# Patient Record
Sex: FEMALE | Race: WHITE | NOT HISPANIC OR LATINO | Employment: PART TIME | URBAN - METROPOLITAN AREA
[De-identification: names, ages, dates, MRNs, and addresses within clinical notes are randomized per-mention and may not be internally consistent; named-entity substitution may affect disease eponyms.]

---

## 2019-05-14 ENCOUNTER — TRANSFERRED RECORDS (OUTPATIENT)
Dept: HEALTH INFORMATION MANAGEMENT | Facility: CLINIC | Age: 59
End: 2019-05-14

## 2019-08-27 ENCOUNTER — DOCUMENTATION ONLY (OUTPATIENT)
Dept: CARE COORDINATION | Facility: CLINIC | Age: 59
End: 2019-08-27

## 2019-08-27 NOTE — TELEPHONE ENCOUNTER
FUTURE VISIT INFORMATION      FUTURE VISIT INFORMATION:    Date: 9/4/19    Time: 8AM    [place] [facility] [place]  REFERRAL INFORMATION:    Referring provider:  Dr Yasir Guerra    Referring providers clinic:  [facility]     Reason for visit/diagnosis  Cholestratoma     RECORDS REQUESTED FROM:       Clinic name Comments Records Status Imaging Status   [facility] 8/28/19 CT Temp  [facility] PACS   [facility]  8/28/19 audiogram  EPIC    [facility] 5/14/19, 4/23/19 notes with Dr Guerra Sent to scan 8/27 8/27/19 11:13AM records from referrals team are being forwarded to clinic.  Sent a fax to [facility] to see if there are any more recent office notes or audiograms- amay     8/28/19 4:40PM [facility] sent a fax back stating patient has not been seen on any other days than the note we have - Amay

## 2019-08-28 ENCOUNTER — OFFICE VISIT (OUTPATIENT)
Dept: AUDIOLOGY | Facility: CLINIC | Age: 59
End: 2019-08-28
Payer: COMMERCIAL

## 2019-08-28 ENCOUNTER — ANCILLARY PROCEDURE (OUTPATIENT)
Dept: CT IMAGING | Facility: CLINIC | Age: 59
End: 2019-08-28
Attending: OTOLARYNGOLOGY
Payer: COMMERCIAL

## 2019-08-28 DIAGNOSIS — H90.0 CONDUCTIVE HEARING LOSS, BILATERAL: Primary | ICD-10-CM

## 2019-08-28 DIAGNOSIS — H71.92 CHOLESTEATOMA, LEFT: ICD-10-CM

## 2019-08-28 NOTE — PROGRESS NOTES
AUDIOLOGY REPORT    SUMMARY: Audiology visit completed. See audiogram for results.      RECOMMENDATIONS: Follow-up with ENT.      Joseph Santiago.  Licensed Audiologist  [place] #[id number]

## 2019-08-29 ENCOUNTER — TELEPHONE (OUTPATIENT)
Dept: OTOLARYNGOLOGY | Facility: CLINIC | Age: 59
End: 2019-08-29

## 2019-08-29 NOTE — TELEPHONE ENCOUNTER
Left message for patient advising her that Dr. Live reviewed her hearing test and CT. The hearing test shows mild middle ear hearing loss bilaterally and her ear drums are pulled in towards her ear, left being worse than right. Her CT does not look bad. If able, Dr. Live wants her to try popping her ears between now and her appointment by plugging her nose and blowing several times a day. If it is painful do not know do it. Left this nurses contact information for call back.

## 2019-09-04 ENCOUNTER — PRE VISIT (OUTPATIENT)
Dept: OTOLARYNGOLOGY | Facility: CLINIC | Age: 59
End: 2019-09-04

## 2019-09-04 ENCOUNTER — OFFICE VISIT (OUTPATIENT)
Dept: OTOLARYNGOLOGY | Facility: CLINIC | Age: 59
End: 2019-09-04
Payer: COMMERCIAL

## 2019-09-04 VITALS — WEIGHT: 151.4 LBS | DIASTOLIC BLOOD PRESSURE: 86 MMHG | HEART RATE: 90 BPM | SYSTOLIC BLOOD PRESSURE: 139 MMHG

## 2019-09-04 DIAGNOSIS — H69.93 DYSFUNCTION OF BOTH EUSTACHIAN TUBES: Primary | ICD-10-CM

## 2019-09-04 RX ORDER — TIOTROPIUM BROMIDE 18 UG/1
1 CAPSULE ORAL; RESPIRATORY (INHALATION) EVERY EVENING
COMMUNITY
Start: 2019-08-19

## 2019-09-04 RX ORDER — MULTIVITAMIN/IRON/FOLIC ACID 18MG-0.4MG
1 TABLET ORAL EVERY MORNING
COMMUNITY

## 2019-09-04 RX ORDER — ALBUTEROL SULFATE 0.83 MG/ML
2.5 SOLUTION RESPIRATORY (INHALATION) PRN
COMMUNITY
Start: 2017-02-23

## 2019-09-04 RX ORDER — IBUPROFEN 200 MG
200 TABLET ORAL PRN
COMMUNITY
End: 2021-02-09

## 2019-09-04 ASSESSMENT — PAIN SCALES - GENERAL: PAINLEVEL: NO PAIN (0)

## 2019-09-04 NOTE — NURSING NOTE
Chief Complaint   Patient presents with     Consult     cholesteatoma left ear     Keily Alex LPN

## 2019-09-04 NOTE — LETTER
9/4/2019       RE: Trupti Mcbride  [address] [address]     Dear Colleague,    Thank you for referring your patient, Trupti Mcbride, to the [facility] EAR NOSE AND THROAT at [facility]. Please see a copy of my visit note below.    Trupti Mcbride is seen in consultation from Dr. Guerra.  She is a 59 year old female being seen for possible left cholesteatoma.  She reports that she has been having cerumen impactions over the last few years and had her ear flushed once by her PCP which was terrible so she decided to have her ears cleaned by ENT.  Dr. Guerra noted a deep retraction pocket on the left.  She has had no ear problems--no otalgia, otorrhea, fullness or hearing loss.  No history of recurrent ear infections, even as a child.    PMHx:  COPD, hypercholesterolemia, anxiety    PSHx:  No otologic surgery    Family History   Problem Relation Age of Onset     Unknown/Adopted Other      Heart Disease Father      Hypertension Father      Hypertension Mother      Hypertension Sister        Social History     Tobacco Use     Smoking status: Light Tobacco Smoker     Packs/day: 0.50     Years: 35.00     Pack years: 17.50     Types: Cigarettes     Smokeless tobacco: Never Used   Substance Use Topics     Alcohol use: Yes     Drug use: Not Currently     Types: Marijuana       Patient Supplied Answers to Review of Systems  [facility] ROS 9/4/2019   Constitutional Problems with sleep   Psychology Frequently feeling anxious   Gastrointestinal/Genitourinary Constipation   The remainder of the 10 point review of systems is otherwise negative.    Physical examination:  Constitutional:  In no acute distress, appears stated age  Eyes:  Extraocular movements intact, no spontaneous nystagmus  Ears:  Both ears examined under the microscope.  Right ear canal clear, TM shows pexy onto the end of the long process of the incus wrapped slightly around the long process and onto the  capitulum, moderate epitympanic retraction pocket, remainder of the middle ear aerated.  Left ear canal clear, TM with a deep anterior superior retraction pocket with a little cerumen on the edge of the pocket, limits of the pocket are not visible, no squamous debris noted, also with pexy of the TM onto the end of the incus but not as wrapped as on the right, small epitympanic retraction pocket, remainder of the middle ear aerated.  Respiratory:  No increased work of breathing, wheezing or stridor  Musculoskeletal:  Good upper extremity strength  Skin:  No rashes on the head and neck  Neurologic:  House Brackman 1/6 bilaterally, ambulating normally  Psychiatric:  Alert, normal affect, answering questions appropriately    Audiogram:  Bilateral mild conductive hearing loss, 100% speech discrimination, bilateral negative pressure tympanogram with the right worse than the left, present right reflexes, absent left reflexes.    Outside records were reviewed including notes, audiogram and CT.  Audiogram similar to what was obtained today.  CT temporal bones showed relatively well developed and aerated mastoids, aerated middle ears with no opacification, ossicular chains intact, otic capsules intact, facial nerves in their usual position.    Assessment and plan:  Bilateral eustachian tube dysfunction with a deep anterior left retraction pocket with obvious squamous debris.  We discussed right t-tube placement.  The risks and benefits were discussed.  Risks include but are not limited to:  Drainage, tympanic membrane perforation requiring future repair and need for further tube placement.  We discussed left t-tube placement with possible cartilage tympanoplasty and t-tube placement if the TM did not insufflate with nitrous administration.  The risks and benefits were discussed.  The risks include but are not limited to:  Worsened hearing which may require further surgery, profound and irreversible hearing loss, dizziness,  damage to the taste nerve, damage to the facial nerve, tympanic membrane perforation requiring further surgery and infection.  Postoperative restrictions were discussed.  She was quite worried about facial nerve paralysis as she reports her boyfriend had parotid surgery and still has weakness of what sounds like the marginal branch and someone at work had ear surgery and had a severe facial weakness after.  I reassured her that the facial injury rates were very low.  After her questions were answered, she would like to proceed with surgery.    Again, thank you for allowing me to participate in the care of your patient.      Sincerely,    Sommer Live MD

## 2019-09-04 NOTE — PATIENT INSTRUCTIONS
Surgery Teaching for Tympanoplasty     1.You must have a physical exam (called  history and physical ) within 30 days of surgery. You can do this at the PAC clinic or your family clinic.     2.For same-day surgery, you must arrange for an adult to take you home from the Center. An adult must stay with you for the first 24 hours after surgery.      3.Ask your doctor what medicines (including aspirin and ibuprofen) are safe before surgery.     4. Stop drinking alcohol at least 24 hours before surgery.     5. Stop or at least cut down on smoking 24 hours before surgery.    6.Take a bath or shower the night before and the morning of surgery (as told by your surgeon). Use an antiseptic soap. If your doctor does not give you special soap, buy Hibiclens or Nicolle-Stat at the drug store or ask the pharmacist to suggest a brand.  Do not put on lotion, powder, perfume, deodorant or make-up after bathing.    7. You can eat a normal meal the night before surgery. Do not eat any solid foods or drink any milk products for 8 hours before surgery.     8. You may drink clear liquids until 2 hours before surgery. Clear liquids include water, Gatorade, apple juice and liquids you can read through.

## 2019-09-04 NOTE — PROGRESS NOTES
Trupti Mcbride is seen in consultation from Dr. Guerra.  She is a 59 year old female being seen for possible left cholesteatoma.  She reports that she has been having cerumen impactions over the last few years and had her ear flushed once by her PCP which was terrible so she decided to have her ears cleaned by ENT.  Dr. Guerra noted a deep retraction pocket on the left.  She has had no ear problems--no otalgia, otorrhea, fullness or hearing loss.  No history of recurrent ear infections, even as a child.    PMHx:  COPD, hypercholesterolemia, anxiety    PSHx:  No otologic surgery    Family History   Problem Relation Age of Onset     Unknown/Adopted Other      Heart Disease Father      Hypertension Father      Hypertension Mother      Hypertension Sister        Social History     Tobacco Use     Smoking status: Light Tobacco Smoker     Packs/day: 0.50     Years: 35.00     Pack years: 17.50     Types: Cigarettes     Smokeless tobacco: Never Used   Substance Use Topics     Alcohol use: Yes     Drug use: Not Currently     Types: Marijuana       Patient Supplied Answers to Review of Systems   ENT ROS 9/4/2019   Constitutional Problems with sleep   Psychology Frequently feeling anxious   Gastrointestinal/Genitourinary Constipation   The remainder of the 10 point review of systems is otherwise negative.    Physical examination:  Constitutional:  In no acute distress, appears stated age  Eyes:  Extraocular movements intact, no spontaneous nystagmus  Ears:  Both ears examined under the microscope.  Right ear canal clear, TM shows pexy onto the end of the long process of the incus wrapped slightly around the long process and onto the capitulum, moderate epitympanic retraction pocket, remainder of the middle ear aerated.  Left ear canal clear, TM with a deep anterior superior retraction pocket with a little cerumen on the edge of the pocket, limits of the pocket are not visible, no squamous debris noted, also with pexy of  the TM onto the end of the incus but not as wrapped as on the right, small epitympanic retraction pocket, remainder of the middle ear aerated.  Respiratory:  No increased work of breathing, wheezing or stridor  Musculoskeletal:  Good upper extremity strength  Skin:  No rashes on the head and neck  Neurologic:  House Brackman 1/6 bilaterally, ambulating normally  Psychiatric:  Alert, normal affect, answering questions appropriately    Audiogram:  Bilateral mild conductive hearing loss, 100% speech discrimination, bilateral negative pressure tympanogram with the right worse than the left, present right reflexes, absent left reflexes.    Outside records were reviewed including notes, audiogram and CT.  Audiogram similar to what was obtained today.  CT temporal bones showed relatively well developed and aerated mastoids, aerated middle ears with no opacification, ossicular chains intact, otic capsules intact, facial nerves in their usual position.    Assessment and plan:  Bilateral eustachian tube dysfunction with a deep anterior left retraction pocket with obvious squamous debris.  We discussed right t-tube placement.  The risks and benefits were discussed.  Risks include but are not limited to:  Drainage, tympanic membrane perforation requiring future repair and need for further tube placement.  We discussed left t-tube placement with possible cartilage tympanoplasty and t-tube placement if the TM did not insufflate with nitrous administration.  The risks and benefits were discussed.  The risks include but are not limited to:  Worsened hearing which may require further surgery, profound and irreversible hearing loss, dizziness, damage to the taste nerve, damage to the facial nerve, tympanic membrane perforation requiring further surgery and infection.  Postoperative restrictions were discussed.  She was quite worried about facial nerve paralysis as she reports her boyfriend had parotid surgery and still has weakness  of what sounds like the marginal branch and someone at work had ear surgery and had a severe facial weakness after.  I reassured her that the facial injury rates were very low.  After her questions were answered, she would like to proceed with surgery.

## 2019-09-05 ENCOUNTER — TELEPHONE (OUTPATIENT)
Dept: OTOLARYNGOLOGY | Facility: CLINIC | Age: 59
End: 2019-09-05

## 2019-09-05 NOTE — TELEPHONE ENCOUNTER
Informed patient that Dr. Live would like her to see PAC due to her COPD and to get clearance for surgery at the [facility]. Patient stated frustration will all the appointments.

## 2019-09-06 ENCOUNTER — TELEPHONE (OUTPATIENT)
Dept: OTOLARYNGOLOGY | Facility: CLINIC | Age: 59
End: 2019-09-06

## 2019-09-06 NOTE — TELEPHONE ENCOUNTER
Left msg to call Latanya in surgery scheduling with Dr Sommer Live [facility]    Latanya Wang   ENT Angela-Op Coordinator  [phone]  [email or web address]

## 2019-09-12 ENCOUNTER — PRE VISIT (OUTPATIENT)
Dept: SURGERY | Facility: CLINIC | Age: 59
End: 2019-09-12

## 2019-09-12 NOTE — TELEPHONE ENCOUNTER
FUTURE VISIT INFORMATION      SURGERY INFORMATION:    Date: 10/23/19    [facility]    Surgeon:  Sommer Preston    Anesthesia Type:  General    RECORDS REQUESTED FROM:       Primary Care Provider: Junior Landeros [facility]     Pertinent Medical History: COPD    Most recent EKG+ Tracin18- [facility]- requested tracing    Most recent PFT's: 5/3/17- [facility]

## 2019-10-02 ENCOUNTER — ANESTHESIA EVENT (OUTPATIENT)
Dept: SURGERY | Facility: CLINIC | Age: 59
End: 2019-10-02

## 2019-10-02 ENCOUNTER — OFFICE VISIT (OUTPATIENT)
Dept: SURGERY | Facility: CLINIC | Age: 59
End: 2019-10-02
Payer: COMMERCIAL

## 2019-10-02 VITALS
HEART RATE: 80 BPM | TEMPERATURE: 98 F | WEIGHT: 153 LBS | BODY MASS INDEX: 24.01 KG/M2 | DIASTOLIC BLOOD PRESSURE: 85 MMHG | RESPIRATION RATE: 16 BRPM | HEIGHT: 67 IN | SYSTOLIC BLOOD PRESSURE: 123 MMHG | OXYGEN SATURATION: 98 %

## 2019-10-02 DIAGNOSIS — Z01.818 PREOP EXAMINATION: Primary | ICD-10-CM

## 2019-10-02 RX ORDER — ATORVASTATIN CALCIUM 20 MG/1
20 TABLET, FILM COATED ORAL EVERY MORNING
COMMUNITY
Start: 2019-09-04

## 2019-10-02 RX ORDER — TETRAHYDROZOLINE HCL 0.05 %
1 DROPS OPHTHALMIC (EYE) PRN
COMMUNITY

## 2019-10-02 ASSESSMENT — PAIN SCALES - GENERAL: PAINLEVEL: NO PAIN (0)

## 2019-10-02 ASSESSMENT — MIFFLIN-ST. JEOR: SCORE: 1311.63

## 2019-10-02 ASSESSMENT — COPD QUESTIONNAIRES: COPD: 1

## 2019-10-02 ASSESSMENT — LIFESTYLE VARIABLES: TOBACCO_USE: 1

## 2019-10-02 NOTE — ANESTHESIA PREPROCEDURE EVALUATION
Anesthesia Pre-Procedure Evaluation    Patient: Trupti Mcbride   MRN:     [id number] Gender:   female   Age:    59 year old :      1960        Preoperative Diagnosis: * No surgery found *        Past Medical History:   Diagnosis Date     Anxiety disorder      COPD (chronic obstructive pulmonary disease) (H)      Dysfunction of both eustachian tubes      Hypercholesterolemia      Tobacco dependence       No past surgical history on file.       Anesthesia Evaluation     . Pt has had prior anesthetic. Type: General    No history of anesthetic complications          ROS/MED HX    ENT/Pulmonary:     (+)other ENT- dysfunction of bilateral eustachian tubes, tobacco use, Current use 0-2 cigs daily packs/day  COPD, , . .    Neurologic:  - neg neurologic ROS     Cardiovascular:     (+) Dyslipidemia, ----. : . . . :. . No previous cardiac testing      (-) taking anticoagulants/antiplatelets   METS/Exercise Tolerance:  >4 METS   Hematologic:  - neg hematologic  ROS       Musculoskeletal:   (+)  other musculoskeletal- congenital defects-underdeveloped right hand, webbing left hand, right club foot s/p surgeries      GI/Hepatic:  - neg GI/hepatic ROS       Renal/Genitourinary:  - ROS Renal section negative       Endo:  - neg endo ROS       Psychiatric:     (+) psychiatric history anxiety      Infectious Disease:  - neg infectious disease ROS       Malignancy:      - no malignancy   Other:    (+) C-spine cleared: N/A, no H/O Chronic Pain,no other significant disability                        PHYSICAL EXAM:   Mental Status/Neuro: A/A/O; Age Appropriate   Airway: Facies: Feasible  Mallampati: I  Mouth/Opening: Full  TM distance: > 6 cm  Neck ROM: Full   Respiratory: Auscultation: Decreased BS     Resp. Rate: Normal     Resp. Effort: Normal      CV: Rhythm: Regular  Heart: Normal Sounds  Edema: None   Comments: Caps upper left front, high arch palate     Dental: Details                  LABS:  CBC: No results found for: WBC,  HGB, HCT, PLT  BMP: No results found for: NA, POTASSIUM, CHLORIDE, CO2, BUN, CR, GLC  COAGS: No results found for: PTT, INR, FIBR  POC: No results found for: BGM, HCG, HCGS  OTHER: No results found for: PH, LACT, A1C, WARD, PHOS, MAG, ALBUMIN, PROTTOTAL, ALT, AST, GGT, ALKPHOS, BILITOTAL, BILIDIRECT, LIPASE, AMYLASE, HAZEL, TSH, T4, T3, CRP, SED     Preop Vitals    BP Readings from Last 3 Encounters:   09/04/19 139/86    Pulse Readings from Last 3 Encounters:   09/04/19 90      Resp Readings from Last 3 Encounters:   No data found for Resp    SpO2 Readings from Last 3 Encounters:   No data found for SpO2      Temp Readings from Last 1 Encounters:   No data found for Temp    Ht Readings from Last 1 Encounters:   No data found for Ht      Wt Readings from Last 1 Encounters:   09/04/19 68.7 kg (151 lb 6.4 oz)    There is no height or weight on file to calculate BMI.     LDA:        JOHN FV AN PLAN NO PONV RULE       PAC Discussion and Assessment    ASA Classification: 2  Case is suitable for: ASC  Anesthetic techniques and relevant risks discussed: GA  Invasive monitoring and risk discussed: No  Types:   Possibility and Risk of blood transfusion discussed: No  NPO instructions given:   Additional anesthetic preparation and risks discussed:   Needs early admission to pre-op area:   Other:     PAC Resident/NP Anesthesia Assessment:  Trupti Mcbirde is a 59 year old female scheduled to undergo bilateral myringotomy and T tube placement, possible left cartilage tympanoplasty with Dr. Live on 10/23/19. She has the following specific operative considerations:   - RCRI : No serious cardiac risks.    - VTE risk: 0.26%  - RUMA # of risks 1/8 = Low risk  - Risk of PONV score = 2.  If > 2, anti-emetic intervention recommended.    --Dysfunction of bilateral eustachian tubes with above procedure planned.   --HLD. Will take Lipitor on DOS. No other cardiac history, symptoms or meds. Good activity tolerance.   --Currrent smoker, now 0-2  cigs daily. 17.5 pack years. COPD, well controlled with no recent exacerbations or hospitalizations. Will use Spriva, Wixela, albuterol neb on DOS.   --Birth defects as described above. Underdeveloped right hand/fingers.   --Anxiety. Grieving loss of mother. No meds at this time. Is receiving psychotherapy.         Patient was reviewed by  Dr Henley.      Reviewed and Signed by PAC Mid-Level Provider/Resident  Mid-Level Provider/Resident: ANDERSON Santos, SEA  Date: 10/2/19  Time: 8:00am    Attending Anesthesiologist Anesthesia Assessment:        Anesthesiologist:   Date:   Time:   Pass/Fail:   Disposition:     PAC Pharmacist Assessment:        Pharmacist:   Date:   Time:    ANDERSON Cutler

## 2019-10-02 NOTE — PATIENT INSTRUCTIONS
Preparing for Your Surgery      Name:  Trupti Mcbride   MRN:  [id number]   :  1960   Today's Date:  10/2/2019     Arriving for surgery:  Surgery date:  10/23/2019  Arrival time:  7:00 am  Please come to:     [facility]  [address] [place] [id number]     Parking is available in front of the Clinics and Surgery Center building from 5:30AM to 8:00PM.  -  Proceed to the 5th floor to check into the Ambulatory Surgery Center.              >> There will be patient concierges on the 1st and 5th floor, for assistance or an escort, if you would like.              >> Please call [phone] with any questions.    What can I eat or drink?  -  You may have solid food or milk products until 8 hours prior to your surgery.(Midnight)  -  You may have water, apple juice or 7up/Sprite until 2 hours prior to your surgery.(6 am)    Which medicines can I take?        Stop Aspirin, vitamins and supplements one week prior to surgery.        Hold Ibuprofen for 24 hours and/or Naproxen for 48 hours prior to surgery.     -  Please take these medications the day of surgery:  Inhalers as usual and bring to surgery center   Take all usual am medications       How do I prepare myself?  -  Take two showers: one the night before surgery; and one the morning of surgery.         Use Scrubcare or Hibiclens to wash from neck down, leave soap on your skin for up to one minute.  Do not get soap in your eyes or ears.  You may use your own shampoo and conditioner; no other hair products.   -  Do NOT use lotion, powder, deodorant, or antiperspirant the day of your surgery.  -  Do NOT wear any makeup, fingernail polish or jewelry.  - Do not bring your own medications to the hospital, except for inhalers and eye   drops.  -  Bring your ID and insurance card.      -If you are scheduled to go home the Same Day as surgery you must have a responsible adult as a  and to stay with you overnight the first  24 hours after surgery.     Questions or Concerns:    -Please call the Ambulatory Surgery Center at  [phone].    -For questions after surgery please call your surgeons office.

## 2019-10-02 NOTE — H&P
Pre-Operative H & P     CC:  Preoperative exam to assess for increased cardiopulmonary risk while undergoing surgery and anesthesia.    Date of Encounter: 10/2/2019  Primary Care Physician:  No primary care provider on file.  Reason for visit: Dysfunction of bilateral eustachian tubes  HPI  Trupti Mcbride is a 59 year old female who presents for pre-operative H & P in preparation for bilateral myringotomy and T tube placement, possible left cartilage tympanoplasty with Dr. Live on 10/23/19 at [facility]. History is obtained from the patient.     Patient who was referred to Dr. Live after being evaluated by local ENT for ear pain. She has been having cerumen impactions over the last few years and had her ear flushed once by her PCP which she found difficult so she sought further evaluation from ENT. She was found to have a deep retraction pocket on the left. She denied ear problems--no otalgia, otorrhea, fullness or hearing loss.  No history of recurrent ear infections, even as a child. She was counseled for above procedure.     Her history is otherwise significant for tobacco dependence, COPD and anxiety. 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 She denies cognitive issues or congenital heart defects. For her pulmonary issues she is followed by outside Pulmonologist, last seen 8/19/19 and considered stable on current regimen.    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, underdeveloped right hand, webbing left hand     COPD (chronic obstructive pulmonary disease) (H)      Dysfunction of both eustachian tubes      Hypercholesterolemia      Tobacco dependence        Past Surgical History  Past Surgical History:   Procedure Laterality Date     FOOT SURGERY Right as child    Surgery  for club foot X 3     Hand surgery Bilateral as child       Hx of Blood transfusions/reactions: Denies.      Hx of abnormal bleeding or anti-platelet use: Denies.     Menstrual history: No LMP recorded. Patient is postmenopausal.    Steroid use in the last year: Denies.     Personal or FH with difficulty with Anesthesia:  Denies.     Prior to Admission Medications  Current Outpatient Medications   Medication Sig Dispense Refill     albuterol (PROVENTIL) (2.5 MG/3ML) 0.083% neb solution Inhale 2.5 mg into the lungs 2 times daily        atorvastatin (LIPITOR) 20 MG tablet Take 20 mg by mouth every morning        calcium carb-cholecalciferol (CALCIUM 500 + D3) 500-200 MG-UNIT tablet Take 2 tablets by mouth every morning        ibuprofen (ADVIL/MOTRIN) 200 MG tablet Take 200 mg by mouth as needed        Multiple Vitamins-Minerals (CENTROVITE) TABS Take 1 tablet by mouth every morning        tetrahydrozoline (VISINE) 0.05 % ophthalmic solution Place 1 drop into both eyes as needed       tiotropium (SPIRIVA) 18 MCG inhaled capsule Inhale 1 capsule into the lungs daily        WIXELA INHUB 250-50 MCG/DOSE inhaler Inhale 1 puff into the lungs 2 times daily          Allergies  No Known Allergies    Social History  Social History     Socioeconomic History     Marital status:      Spouse name: Not on file     Number of children: Not on file     Years of education: Not on file     Highest education level: Not on file   Occupational History     Not on file   Social Needs     Financial resource strain: Not on file     Food insecurity:     Worry: Not on file     Inability: Not on file     Transportation needs:     Medical: Not on file     Non-medical: Not on file   Tobacco Use     Smoking status: Light Tobacco Smoker     Packs/day: 0.50     Years: 35.00     Pack years: 17.50     Types: Cigarettes     Smokeless tobacco: Never Used     Tobacco comment: now 0-2 cigs daily   Substance and Sexual Activity     Alcohol use: Yes      Comment: occ     Drug use: Not Currently     Types: Marijuana     Sexual activity: Not Currently     Partners: Male     Birth control/protection: None   Lifestyle     Physical activity:     Days per week: Not on file     Minutes per session: Not on file     Stress: Not on file   Relationships     Social connections:     Talks on phone: Not on file     Gets together: Not on file     Attends Yarsanism service: Not on file     Active member of club or organization: Not on file     Attends meetings of clubs or organizations: Not on file     Relationship status: Not on file     Intimate partner violence:     Fear of current or ex partner: Not on file     Emotionally abused: Not on file     Physically abused: Not on file     Forced sexual activity: Not on file   Other Topics Concern     Not on file   Social History Narrative     Not on file       Family History  Family History   Problem Relation Age of Onset     Unknown/Adopted Other      Heart Disease Father      Hypertension Father      Hypertension Mother      Hypertension Sister        Review of Systems    The complete review of systems is negative other than noted in the HPI or here.   ROS/MED HX    ENT/Pulmonary:     (+)other ENT- dysfunction of bilateral eustachian tubes, tobacco use, Current use COPD, , . .    Neurologic:  - neg neurologic ROS     Cardiovascular:     (+) Dyslipidemia, ----. : . . . :. . No previous cardiac testing      (-) taking anticoagulants/antiplatelets   METS/Exercise Tolerance:  >4 METS   Hematologic:  - neg hematologic  ROS       Musculoskeletal:  -birth defects as above     GI/Hepatic:  - neg GI/hepatic ROS       Renal/Genitourinary:  - ROS Renal section negative       Endo:  - neg endo ROS       Psychiatric:     (+) psychiatric history anxiety      Infectious Disease:  - neg infectious disease ROS       Malignancy:      - no malignancy   Other:    (+) C-spine cleared: N/A, no H/O Chronic Pain,no other significant disability             "PHYSICAL EXAM:   Mental Status/Neuro: A/A/O; Age Appropriate   Airway: Facies: Feasible  Mallampati: I  Mouth/Opening: Full  TM distance: > 6 cm  Neck ROM: Full   Respiratory: Auscultation: CTAB     Resp. Rate: Normal     Resp. Effort: Normal      CV: Rhythm: Regular  Heart: Normal Sounds  Edema: None   Comments:        Preop Vitals    BP Readings from Last 3 Encounters:   09/04/19 139/86    Pulse Readings from Last 3 Encounters:   09/04/19 90      Resp Readings from Last 3 Encounters:   No data found for Resp    SpO2 Readings from Last 3 Encounters:   No data found for SpO2      Temp Readings from Last 1 Encounters:   No data found for Temp    Ht Readings from Last 1 Encounters:   No data found for Ht      Wt Readings from Last 1 Encounters:   09/04/19 68.7 kg (151 lb 6.4 oz)    There is no height or weight on file to calculate BMI.       Temp: 98  F (36.7  C) Temp src: Oral BP: 123/85 Pulse: 80   Resp: 16 SpO2: 98 %         153 lbs 0 oz  5' 7\"   Body mass index is 23.96 kg/m .       Physical Exam  Constitutional: Awake, alert, cooperative, no apparent distress, and appears stated age.  Eyes: Pupils equal, round and reactive to light, extra ocular muscles intact, sclera clear, conjunctiva normal.  HENT: Normocephalic, oral pharynx with moist mucus membranes, good dentition. No goiter appreciated.   Respiratory: Clear to auscultation bilaterally, no crackles or wheezing. Coarse and diminished BS. No cough or obvious dyspnea.  Cardiovascular: Regular rate and rhythm, normal S1 and S2, and no murmur noted.  Carotids +2, no bruits. No edema. Palpable pulses to radial  DP and PT arteries.   GI: Normal bowel sounds, soft, non-distended, non-tender, no masses palpated, no hepatosplenomegaly.    Lymph/Hematologic: No cervical lymphadenopathy and no supraclavicular lymphadenopathy.  Genitourinary:  Deferred.   Skin: Warm and dry.    Musculoskeletal: Full ROM of neck. There is no redness, warmth, or swelling of the joints. " Right hand with underdeveloped and absence of fingers. Gross motor strength is normal.    Neurologic: Awake, alert, oriented to name, place and time. Cranial nerves II-XII are grossly intact. Gait is normal.   Neuropsychiatric: Calm, cooperative. Normal affect.   Labs: (personally reviewed)   Labs OSH 9/3/19  WBC 7.7  Hgb 14.6  Hct 40.9  Platelets 300  Na 138  K 4.2  Cl 107  Glu 87  Cr 0.76  GFR >60  LFTs normal  EKG: Not indicated.     PFT's: OSH  Spirometry April 20, 2017 . FEV1 1.07L (39%), FVC 2.43L (68%), FEV/FVC (44%).  CPFT May 3, 2017 personally reviewed: consistent with moderate obstruction, air trapping, hyperinflation and reduced diffusing capacity. FEV1 1.9L (65%), FVC 3.6L (93%), FEV1/FVC (54%), TLC (123%), RV (176%), DLCOunc 78%.    CT temporal bones 8/28/19  Impression:    1. Right temporal bone: Moderate mastoid effusion and mild debris in  the external auditory canal; otherwise unremarkable.  2. Left temporal bone: Partially retracted left tympanic membrane. No  evidence of cholesteatoma. Small mastoid effusion.    Imaging and PFTs reviewed by this provider    Outside records reviewed from: Care Everywhere    ASSESSMENT and PLAN  Trupti Mcbride is a 59 year old female scheduled to undergo bilateral myringotomy and T tube placement, possible left cartilage tympanoplasty with Dr. Live on 10/23/19. She has the following specific operative considerations:   - RCRI : No serious cardiac risks.    - Anesthesia considerations:  Refer to PAC assessment in anesthesia records  - VTE risk: 0.26%  - RUMA # of risks 1/8 = Low risk  - Risk of PONV score = 2.  If > 2, anti-emetic intervention recommended.    --Dysfunction of bilateral eustachian tubes with above procedure planned.   --HLD. Will take Lipitor on DOS. No other cardiac history, symptoms or meds. Good activity tolerance.   --Currrent smoker, now 0-2 cigs daily. 17.5 pack years. COPD, well controlled with no recent exacerbations or hospitalizations. Will  use Spriva, Wixela, albuterol neb on DOS.   --Birth defects as described above. Underdeveloped right hand/fingers.   --Anxiety. Grieving loss of mother. No meds at this time. Is receiving psychotherapy.     Arrival time, NPO, shower and medication instructions provided by nursing staff today. Preparing For Your Surgery handout given.      Patient was reviewed by  Dr Henley.    ANDERSON Cutler CNS  Preoperative Assessment Center  [facility]  Phone: [phone]  Fax: [phone]

## 2019-10-04 ENCOUNTER — TELEPHONE (OUTPATIENT)
Dept: OTOLARYNGOLOGY | Facility: CLINIC | Age: 59
End: 2019-10-04

## 2019-10-22 ENCOUNTER — ANESTHESIA EVENT (OUTPATIENT)
Dept: SURGERY | Facility: AMBULATORY SURGERY CENTER | Age: 59
End: 2019-10-22

## 2019-10-23 ENCOUNTER — HOSPITAL ENCOUNTER (OUTPATIENT)
Facility: AMBULATORY SURGERY CENTER | Age: 59
End: 2019-10-23
Attending: OTOLARYNGOLOGY
Payer: COMMERCIAL

## 2019-10-23 ENCOUNTER — ANESTHESIA (OUTPATIENT)
Dept: SURGERY | Facility: AMBULATORY SURGERY CENTER | Age: 59
End: 2019-10-23

## 2019-10-23 VITALS
WEIGHT: 153 LBS | DIASTOLIC BLOOD PRESSURE: 78 MMHG | HEART RATE: 77 BPM | HEIGHT: 67 IN | OXYGEN SATURATION: 97 % | SYSTOLIC BLOOD PRESSURE: 115 MMHG | BODY MASS INDEX: 24.01 KG/M2 | TEMPERATURE: 98.1 F | RESPIRATION RATE: 16 BRPM

## 2019-10-23 RX ORDER — GLYCOPYRROLATE 0.2 MG/ML
INJECTION, SOLUTION INTRAMUSCULAR; INTRAVENOUS PRN
Status: DISCONTINUED | OUTPATIENT
Start: 2019-10-23 | End: 2019-10-23

## 2019-10-23 RX ORDER — OFLOXACIN 3 MG/ML
SOLUTION AURICULAR (OTIC) PRN
Status: DISCONTINUED | OUTPATIENT
Start: 2019-10-23 | End: 2019-10-23 | Stop reason: HOSPADM

## 2019-10-23 RX ORDER — HYDROMORPHONE HYDROCHLORIDE 1 MG/ML
.3-.5 INJECTION, SOLUTION INTRAMUSCULAR; INTRAVENOUS; SUBCUTANEOUS EVERY 10 MIN PRN
Status: DISCONTINUED | OUTPATIENT
Start: 2019-10-23 | End: 2019-10-24 | Stop reason: HOSPADM

## 2019-10-23 RX ORDER — FENTANYL CITRATE 50 UG/ML
25-50 INJECTION, SOLUTION INTRAMUSCULAR; INTRAVENOUS
Status: DISCONTINUED | OUTPATIENT
Start: 2019-10-23 | End: 2019-10-24 | Stop reason: HOSPADM

## 2019-10-23 RX ORDER — SODIUM CHLORIDE, SODIUM LACTATE, POTASSIUM CHLORIDE, CALCIUM CHLORIDE 600; 310; 30; 20 MG/100ML; MG/100ML; MG/100ML; MG/100ML
INJECTION, SOLUTION INTRAVENOUS CONTINUOUS
Status: DISCONTINUED | OUTPATIENT
Start: 2019-10-23 | End: 2019-10-23 | Stop reason: HOSPADM

## 2019-10-23 RX ORDER — CEFAZOLIN SODIUM 2 G/50ML
2 SOLUTION INTRAVENOUS
Status: DISCONTINUED | OUTPATIENT
Start: 2019-10-23 | End: 2019-10-23 | Stop reason: HOSPADM

## 2019-10-23 RX ORDER — OXYCODONE HYDROCHLORIDE 5 MG/1
5-10 TABLET ORAL EVERY 4 HOURS PRN
Status: DISCONTINUED | OUTPATIENT
Start: 2019-10-23 | End: 2019-10-24 | Stop reason: HOSPADM

## 2019-10-23 RX ORDER — MEPERIDINE HYDROCHLORIDE 25 MG/ML
12.5 INJECTION INTRAMUSCULAR; INTRAVENOUS; SUBCUTANEOUS
Status: DISCONTINUED | OUTPATIENT
Start: 2019-10-23 | End: 2019-10-24 | Stop reason: HOSPADM

## 2019-10-23 RX ORDER — PROPOFOL 10 MG/ML
INJECTION, EMULSION INTRAVENOUS CONTINUOUS PRN
Status: DISCONTINUED | OUTPATIENT
Start: 2019-10-23 | End: 2019-10-23

## 2019-10-23 RX ORDER — ACETAMINOPHEN 325 MG/1
650 TABLET ORAL
Status: DISCONTINUED | OUTPATIENT
Start: 2019-10-23 | End: 2019-10-24 | Stop reason: HOSPADM

## 2019-10-23 RX ORDER — ONDANSETRON 2 MG/ML
4 INJECTION INTRAMUSCULAR; INTRAVENOUS EVERY 30 MIN PRN
Status: DISCONTINUED | OUTPATIENT
Start: 2019-10-23 | End: 2019-10-24 | Stop reason: HOSPADM

## 2019-10-23 RX ORDER — ACETAMINOPHEN 325 MG/1
975 TABLET ORAL ONCE
Status: COMPLETED | OUTPATIENT
Start: 2019-10-23 | End: 2019-10-23

## 2019-10-23 RX ORDER — DEXAMETHASONE SODIUM PHOSPHATE 4 MG/ML
4 INJECTION, SOLUTION INTRA-ARTICULAR; INTRALESIONAL; INTRAMUSCULAR; INTRAVENOUS; SOFT TISSUE EVERY 10 MIN PRN
Status: DISCONTINUED | OUTPATIENT
Start: 2019-10-23 | End: 2019-10-24 | Stop reason: HOSPADM

## 2019-10-23 RX ORDER — FENTANYL CITRATE 50 UG/ML
INJECTION, SOLUTION INTRAMUSCULAR; INTRAVENOUS PRN
Status: DISCONTINUED | OUTPATIENT
Start: 2019-10-23 | End: 2019-10-23

## 2019-10-23 RX ORDER — SODIUM CHLORIDE, SODIUM LACTATE, POTASSIUM CHLORIDE, CALCIUM CHLORIDE 600; 310; 30; 20 MG/100ML; MG/100ML; MG/100ML; MG/100ML
INJECTION, SOLUTION INTRAVENOUS CONTINUOUS
Status: DISCONTINUED | OUTPATIENT
Start: 2019-10-23 | End: 2019-10-24 | Stop reason: HOSPADM

## 2019-10-23 RX ORDER — DEXAMETHASONE SODIUM PHOSPHATE 4 MG/ML
INJECTION, SOLUTION INTRA-ARTICULAR; INTRALESIONAL; INTRAMUSCULAR; INTRAVENOUS; SOFT TISSUE PRN
Status: DISCONTINUED | OUTPATIENT
Start: 2019-10-23 | End: 2019-10-23

## 2019-10-23 RX ORDER — KETOROLAC TROMETHAMINE 30 MG/ML
30 INJECTION, SOLUTION INTRAMUSCULAR; INTRAVENOUS EVERY 6 HOURS PRN
Status: DISCONTINUED | OUTPATIENT
Start: 2019-10-23 | End: 2019-10-24 | Stop reason: HOSPADM

## 2019-10-23 RX ORDER — CEFAZOLIN SODIUM 1 G/50ML
1 SOLUTION INTRAVENOUS SEE ADMIN INSTRUCTIONS
Status: DISCONTINUED | OUTPATIENT
Start: 2019-10-23 | End: 2019-10-23 | Stop reason: HOSPADM

## 2019-10-23 RX ORDER — NALOXONE HYDROCHLORIDE 0.4 MG/ML
.1-.4 INJECTION, SOLUTION INTRAMUSCULAR; INTRAVENOUS; SUBCUTANEOUS
Status: DISCONTINUED | OUTPATIENT
Start: 2019-10-23 | End: 2019-10-24 | Stop reason: HOSPADM

## 2019-10-23 RX ORDER — PROPOFOL 10 MG/ML
INJECTION, EMULSION INTRAVENOUS PRN
Status: DISCONTINUED | OUTPATIENT
Start: 2019-10-23 | End: 2019-10-23

## 2019-10-23 RX ORDER — LIDOCAINE 40 MG/G
CREAM TOPICAL
Status: DISCONTINUED | OUTPATIENT
Start: 2019-10-23 | End: 2019-10-23 | Stop reason: HOSPADM

## 2019-10-23 RX ORDER — GABAPENTIN 300 MG/1
300 CAPSULE ORAL ONCE
Status: COMPLETED | OUTPATIENT
Start: 2019-10-23 | End: 2019-10-23

## 2019-10-23 RX ORDER — ONDANSETRON 2 MG/ML
INJECTION INTRAMUSCULAR; INTRAVENOUS PRN
Status: DISCONTINUED | OUTPATIENT
Start: 2019-10-23 | End: 2019-10-23

## 2019-10-23 RX ORDER — FENTANYL CITRATE 50 UG/ML
25-50 INJECTION, SOLUTION INTRAMUSCULAR; INTRAVENOUS
Status: DISCONTINUED | OUTPATIENT
Start: 2019-10-23 | End: 2019-10-23 | Stop reason: HOSPADM

## 2019-10-23 RX ORDER — DEXAMETHASONE SODIUM PHOSPHATE 10 MG/ML
10 INJECTION, SOLUTION INTRAMUSCULAR; INTRAVENOUS ONCE
Status: DISCONTINUED | OUTPATIENT
Start: 2019-10-23 | End: 2019-10-23 | Stop reason: HOSPADM

## 2019-10-23 RX ORDER — LIDOCAINE HYDROCHLORIDE 20 MG/ML
INJECTION, SOLUTION INFILTRATION; PERINEURAL PRN
Status: DISCONTINUED | OUTPATIENT
Start: 2019-10-23 | End: 2019-10-23

## 2019-10-23 RX ORDER — ALBUTEROL SULFATE 0.83 MG/ML
2.5 SOLUTION RESPIRATORY (INHALATION) EVERY 4 HOURS PRN
Status: DISCONTINUED | OUTPATIENT
Start: 2019-10-23 | End: 2019-10-23 | Stop reason: HOSPADM

## 2019-10-23 RX ORDER — ONDANSETRON 4 MG/1
4 TABLET, ORALLY DISINTEGRATING ORAL EVERY 30 MIN PRN
Status: DISCONTINUED | OUTPATIENT
Start: 2019-10-23 | End: 2019-10-24 | Stop reason: HOSPADM

## 2019-10-23 RX ADMIN — FENTANYL CITRATE 50 MCG: 50 INJECTION, SOLUTION INTRAMUSCULAR; INTRAVENOUS at 10:37

## 2019-10-23 RX ADMIN — GABAPENTIN 300 MG: 300 CAPSULE ORAL at 08:56

## 2019-10-23 RX ADMIN — PROPOFOL 130 MG: 10 INJECTION, EMULSION INTRAVENOUS at 10:52

## 2019-10-23 RX ADMIN — OXYCODONE HYDROCHLORIDE 5 MG: 5 TABLET ORAL at 11:29

## 2019-10-23 RX ADMIN — DEXAMETHASONE SODIUM PHOSPHATE 4 MG: 4 INJECTION, SOLUTION INTRA-ARTICULAR; INTRALESIONAL; INTRAMUSCULAR; INTRAVENOUS; SOFT TISSUE at 10:52

## 2019-10-23 RX ADMIN — LIDOCAINE HYDROCHLORIDE 55 MG: 20 INJECTION, SOLUTION INFILTRATION; PERINEURAL at 10:52

## 2019-10-23 RX ADMIN — SODIUM CHLORIDE, SODIUM LACTATE, POTASSIUM CHLORIDE, CALCIUM CHLORIDE: 600; 310; 30; 20 INJECTION, SOLUTION INTRAVENOUS at 09:02

## 2019-10-23 RX ADMIN — PROPOFOL 200 MCG/KG/MIN: 10 INJECTION, EMULSION INTRAVENOUS at 10:47

## 2019-10-23 RX ADMIN — ACETAMINOPHEN 975 MG: 325 TABLET ORAL at 08:56

## 2019-10-23 RX ADMIN — GLYCOPYRROLATE 0.2 MG: 0.2 INJECTION, SOLUTION INTRAMUSCULAR; INTRAVENOUS at 10:37

## 2019-10-23 RX ADMIN — SODIUM CHLORIDE, SODIUM LACTATE, POTASSIUM CHLORIDE, CALCIUM CHLORIDE: 600; 310; 30; 20 INJECTION, SOLUTION INTRAVENOUS at 10:36

## 2019-10-23 RX ADMIN — ONDANSETRON 4 MG: 2 INJECTION INTRAMUSCULAR; INTRAVENOUS at 11:08

## 2019-10-23 ASSESSMENT — LIFESTYLE VARIABLES: TOBACCO_USE: 1

## 2019-10-23 ASSESSMENT — MIFFLIN-ST. JEOR: SCORE: 1311.63

## 2019-10-23 ASSESSMENT — COPD QUESTIONNAIRES: COPD: 1

## 2019-10-23 NOTE — DISCHARGE INSTRUCTIONS
1.  Dry ear precautions for 1 week after surgery.    2.  After 1 week, may put head underwater without ear plugs if the water is chlorinated.  Otherwise, keep ears dry with ear plugs/headband.    3.  Floxin drops 5 drops twice a day to the both ears for 2 days.    4.  Tylenol as needed for pain.    5.  Follow up as scheduled for postoperative audiogram and check.    6.  If you have any questions, please call the Pediatric ENT clinic during daytime hours or call the  and ask for the ENT resident on call during evening/weekend hours.    [facility]  Home Care Following Anesthesia  For 24 hours after surgery:  1. Get plenty of rest.  A responsible adult must stay with you for at least 24 hours after you leave the surgery center.  2. Do not drive or use heavy equipment.  If you have weakness or tingling, don't drive or use heavy equipment until this feeling goes away.   3. Do not drink alcohol.   4. Avoid strenuous or risky activities.  Ask for help when climbing stairs.  5. You may feel lightheaded.  IF so, sit for a few minutes before standing.  Have someone help you get up.   6. If you have nausea (feel sick to your stomach): Drink only clear liquids such as apple juice, ginger ale, broth or 7-Up.  Rest may also help.  Be sure to drink enough fluids.  Move to a regular diet as you feel able.   7. You may have a slight fever.  Call the doctor if your fever is over 100 F (37.7 C) (taken under the tongue) or lasts longer than 24 hours.  8. You may have a dry mouth, a sore throat, muscle aches or trouble sleeping. These should go away after 24 hours.  9. Do not make important or legal decisions.             Tips for taking pain medications  To get the best pain relief possible, remember these points:    Take pain medications as directed, before pain becomes severe.    Pain medication can upset your stomach: taking it with food may help.    Constipation is a common side effect  of pain medication. Drink plenty of  fluids.    Eat foods high in fiber. Take a stool softener if recommended by your doctor or pharmacist.    Do not drink alcohol, drive or operate machinery while taking pain medications.    Ask about other ways to control pain, such as with heat, ice or relaxation.    Tylenol/Acetaminophen Consumption  To help encourage the safe use of acetaminophen, the makers of TYLENOL  have lowered the maximum daily dose for single-ingredient Extra Strength TYLENOL  (acetaminophen) products sold in the [place] from 8 pills per day (4,000 mg) to 6 pills per day (3,000 mg). The dosing interval has also changed from 2 pills every 4-6 hours to 2 pills every 6 hours.    If you feel your pain relief is insufficient, you may take Tylenol/Acetaminophen in addition to your narcotic pain medication.     Be careful not to exceed 3,000 mg of Tylenol/Acetaminophen in a 24 hour period from all sources.    If you are taking extra strength Tylenol/acetaminophen (500 mg), the maximum dose is 6 tablets in 24 hours.    If you are taking regular strength acetaminophen (325 mg), the maximum dose is 9 tablets in 24 hours.+    Tylenol 975mg given at 8:56am. Next dose available after 3pm. Then follow package instructions.     A one time dose of Oxycodone 5mg given at 11:30am.  This will last 4 - 6 hours.     Call a doctor for any of the followin. Signs of infection (fever, growing tenderness at the surgery site, a large amount of drainage or bleeding, severe pain, foul-smelling drainage, redness, swelling).  2. It has been over 8 to 10 hours since surgery and you are still not able to urinate (pass water).  3. Headache for over 24 hours.    Your doctor is:  Dr. Sommer Live, ENT Otolaryngology: [phone]                  Or dial [phone] and ask for the resident on call for:  ENT Otolaryngology  For emergency care, call the:  [place] [facility]:  [phone] (TTY for hearing impaired:  [phone])

## 2019-10-23 NOTE — ANESTHESIA CARE TRANSFER NOTE
Patient: Trupti Mcbride    Procedure(s):  Bilateral Myringotomy and Tympanostomy Tube Placement    Diagnosis: Bilateral Eustachian Tube Dysfunction  Diagnosis Additional Information: No value filed.    Anesthesia Type:   No value filed.     Note:  Airway :Nasal Cannula  Patient transferred to:PACU  Comments: Arrive PACU, Stable, Airway Intact  107/71, 70,16,100%,97.6  All questions answered.Handoff Report: Identifed the Patient, Identified the Reponsible Provider, Reviewed the pertinent medical history, Discussed the surgical course, Reviewed Intra-OP anesthesia mangement and issues during anesthesia, Set expectations for post-procedure period and Allowed opportunity for questions and acknowledgement of understanding      Vitals: (Last set prior to Anesthesia Care Transfer)    CRNA VITALS  10/23/2019 1046 - 10/23/2019 1120      10/23/2019             Pulse:  66    SpO2:  100 %    Resp Rate (observed):  12    Resp Rate (set):  10                Electronically Signed By: ANDERSON Olivas CRNA  October 23, 2019  11:20 AM

## 2019-10-23 NOTE — ANESTHESIA PREPROCEDURE EVALUATION
Anesthesia Pre-Procedure Evaluation    Patient: Trupti Mcbride   MRN:     [id number] Gender:   female   Age:    59 year old :      1960        Preoperative Diagnosis: Bilateral Eustachian Tube Dysfunction   Procedure(s):  Bilateral Myringotomy and Tympanostomy Tube Placement     Past Medical History:   Diagnosis Date     Anxiety disorder      Congenital birth defect     Right side club foot, underdeveloped right hand, webbing left hand     COPD (chronic obstructive pulmonary disease) (H)      Dysfunction of both eustachian tubes      Hypercholesterolemia      Tobacco dependence       Past Surgical History:   Procedure Laterality Date     FOOT SURGERY Right as child    Surgery for club foot X 3     Hand surgery Bilateral as child          Anesthesia Evaluation     .             ROS/MED HX    ENT/Pulmonary:     (+)tobacco use, Intermittent asthma COPD, , . .    Neurologic:  - neg neurologic ROS     Cardiovascular:     (+) Dyslipidemia, ----. : . . . :. .       METS/Exercise Tolerance:     Hematologic:  - neg hematologic  ROS       Musculoskeletal:  - neg musculoskeletal ROS       GI/Hepatic:  - neg GI/hepatic ROS       Renal/Genitourinary:  - ROS Renal section negative       Endo:  - neg endo ROS       Psychiatric:  - neg psychiatric ROS       Infectious Disease:  - neg infectious disease ROS       Malignancy:      - no malignancy   Other:                         PHYSICAL EXAM:   Mental Status/Neuro: A/A/O   Airway:   Mallampati: II  Mouth/Opening: Full  TM distance: > 6 cm  Neck ROM: Full   Respiratory: Auscultation: CTAB     Resp. Rate: Normal     Resp. Effort: Normal      CV: Rhythm: Regular  Rate: Age appropriate  Heart: Normal Sounds  Edema: None   Comments:      Dental: Normal Dentition                LABS:  CBC: No results found for: WBC, HGB, HCT, PLT  BMP: No results found for: NA, POTASSIUM, CHLORIDE, CO2, BUN, CR, GLC  COAGS: No results found for: PTT, INR, FIBR  POC: No results found for: BGM, HCG,  "HCGS  OTHER: No results found for: PH, LACT, A1C, WARD, PHOS, MAG, ALBUMIN, PROTTOTAL, ALT, AST, GGT, ALKPHOS, BILITOTAL, BILIDIRECT, LIPASE, AMYLASE, HAZEL, TSH, T4, T3, CRP, SED     Preop Vitals    BP Readings from Last 3 Encounters:   10/23/19 107/71   10/02/19 123/85   09/04/19 139/86    Pulse Readings from Last 3 Encounters:   10/23/19 70   10/02/19 80   09/04/19 90      Resp Readings from Last 3 Encounters:   10/23/19 17   10/02/19 16    SpO2 Readings from Last 3 Encounters:   10/23/19 100%   10/02/19 98%      Temp Readings from Last 1 Encounters:   10/23/19 36.4  C (97.6  F) (Temporal)    Ht Readings from Last 1 Encounters:   10/23/19 1.702 m (5' 7\")      Wt Readings from Last 1 Encounters:   10/23/19 69.4 kg (153 lb)    Estimated body mass index is 23.96 kg/m  as calculated from the following:    Height as of this encounter: 1.702 m (5' 7\").    Weight as of this encounter: 69.4 kg (153 lb).     LDA:  Peripheral IV 10/23/19 Left Hand (Active)   Site Assessment WDL 10/23/2019 11:19 AM   Line Status Infusing 10/23/2019 11:19 AM   Phlebitis Scale 0-->no symptoms 10/23/2019 11:19 AM   Infiltration Scale 0 10/23/2019 11:19 AM   Extravasation? No 10/23/2019  9:00 AM   Number of days: 0        Assessment:   ASA SCORE: 2    H&P: History and physical reviewed and following examination; no interval change.   Smoking Status:  Active Smoker   NPO Status: NPO Appropriate     Plan:   Anes. Type:  General   Pre-Medication: None   Induction:  IV (Standard)   Airway: LMA   Access/Monitoring: PIV   Maintenance: Balanced     Postop Plan:   Postop Pain: Opioids  Postop Sedation/Airway: Not planned  Disposition: Outpatient     PONV Management:   Adult Risk Factors: Female, Postop Opioids   Prevention: Ondansetron, Propofol     CONSENT: Direct conversation   Plan and risks discussed with: Patient   Blood Products: Consent Deferred (Minimal Blood Loss)                   Radu Hudson MD  Staff Anesthesiologist  [phone]    "

## 2019-10-23 NOTE — ANESTHESIA POSTPROCEDURE EVALUATION
Anesthesia POST Procedure Evaluation    Patient: Trupti Mcbride   MRN:     [id number] Gender:   female   Age:    59 year old :      1960        Preoperative Diagnosis: Bilateral Eustachian Tube Dysfunction   Procedure(s):  Bilateral Myringotomy and Tympanostomy Tube Placement   Postop Comments: No value filed.       Anesthesia Type:  Not documented  No value filed.    Reportable Event: NO     PAIN: Uncomplicated   Sign Out status: Comfortable, Well controlled pain     PONV: No PONV   Sign Out status:  No Nausea or Vomiting     Neuro/Psych: Uneventful perioperative course   Sign Out Status: Preoperative baseline; Age appropriate mentation     Airway/Resp.: Uneventful perioperative course   Sign Out Status: Non labored breathing, age appropriate RR; Resp. Status within EXPECTED Parameters     CV: Uneventful perioperative course   Sign Out status: Appropriate BP and perfusion indices; Appropriate HR/Rhythm     Disposition:   Sign Out in:  PACU  Disposition:  Phase II; Home  Recovery Course: Uneventful  Follow-Up: Not required           Last Anesthesia Record Vitals:  CRNA VITALS  10/23/2019 1046 - 10/23/2019 1146      10/23/2019             Pulse:  66    SpO2:  100 %    Resp Rate (observed):  12    Resp Rate (set):  10          Last PACU Vitals:  Vitals Value Taken Time   /78 10/23/2019 11:30 AM   Temp 36.4  C (97.6  F) 10/23/2019 11:30 AM   Pulse 77 10/23/2019 11:30 AM   Resp 24 10/23/2019 11:35 AM   SpO2 95 % 10/23/2019 11:35 AM   Temp src     NIBP     Pulse     SpO2     Resp     Temp     Ht Rate     Temp 2     Vitals shown include unvalidated device data.      Electronically Signed By: Radu Hudson MD, 2019, 12:47 PM   177.8

## 2019-10-23 NOTE — OP NOTE
"Date of service:  10/23/19    Preoperative diagnosis:  eustachian tube dysfunction    Postoperative diagnosis:  eustachian tube dysfunction    Procedure:  Bilateral myringotomy and tube placement    Surgeon:  Sommer Live MD    Fellow:  Chun Lemus MD    Anesthesia:  General    Complications:  None    EBL:  None    Specimens:  None    Findings:  Right with no effusion and left with no effusion.     Indications:  Trupti Mcbride is a 59 year old female with eustachian tube dysfunction.  The above procedure was discussed with the patient and consent was obtained.    Procedure:  Patient was taken to the operating room and placed supine on the operating table.  After general anesthesia was given and a LMA was placed, Time Out was then performed with confirmation of the patient, site and procedure.  70% nitrous was administered.  The operating microscope was brought into position and the right ear was examined.  Cerumen was removed.  The tympanic membrane was intact and the middle ear was well insufflated.  The TM is quite thin throughout.  Myringotomy incision was made in the anterior inferior quadrant with return of no effusion.  T-tube was placed without difficulty and Floxin drops instilled into the ear canal.  The opposite ear was examined and cerumen removed.  Tympanic membrane intact and continued to be retracted anterior superior but the posterior quadrant was insufflated.  There was noted to be a crust on the anterior superior quadrant and this was removed with a straight pick, right angle pick and alligator.  Once this was done, the TM then insufflated outwards significantly.  Myringotomy incision was made in the anterior inferior quadrant with return of no effusion and a t-tube placed without difficulty and Floxin instilled into the ear canal.  The patient tolerated the procedure well with no complications.  Awakened and taken to the PACU in stable condition.    \"I was present for the entire procedure.\"    "

## 2019-11-08 DIAGNOSIS — H91.90 HEARING LOSS: Primary | ICD-10-CM

## 2019-11-20 ENCOUNTER — OFFICE VISIT (OUTPATIENT)
Dept: AUDIOLOGY | Facility: CLINIC | Age: 59
End: 2019-11-20
Attending: OTOLARYNGOLOGY
Payer: COMMERCIAL

## 2019-11-20 ENCOUNTER — OFFICE VISIT (OUTPATIENT)
Dept: OTOLARYNGOLOGY | Facility: CLINIC | Age: 59
End: 2019-11-20
Payer: COMMERCIAL

## 2019-11-20 VITALS — BODY MASS INDEX: 24.17 KG/M2 | HEIGHT: 67 IN | WEIGHT: 154 LBS

## 2019-11-20 DIAGNOSIS — H90.0 CONDUCTIVE HEARING LOSS, BILATERAL: Primary | ICD-10-CM

## 2019-11-20 DIAGNOSIS — H69.93 DYSFUNCTION OF BOTH EUSTACHIAN TUBES: Primary | ICD-10-CM

## 2019-11-20 DIAGNOSIS — H91.90 HEARING LOSS: ICD-10-CM

## 2019-11-20 ASSESSMENT — MIFFLIN-ST. JEOR: SCORE: 1316.29

## 2019-11-20 ASSESSMENT — PAIN SCALES - GENERAL: PAINLEVEL: NO PAIN (0)

## 2019-11-20 NOTE — PROGRESS NOTES
Trupti Mcbride is seen for her first postoperative visit. She is s/p bilateral myringotomy with T-tube placement on 10/23/2019.  She had deep retraction pockets     Physical examination:  female in no acute distress.  Alert and answering questions appropriately.  HB 1/6 bilaterally.  Both ears examined under the microscope.  Right t-tube in position and open, mesotympanum aerated with a band that may still be attached to the long process of the incus but no retractions or squamous buildup, moderate anterior epitympanic retraction pocket which appears aerated, remainder of the middle ear aerated.  Left ear canal with dried blood, a little was removed from the lumen of the t-tube which was not fully obstructed, the remainder of the dried blood around the t-tube was not manipulated as it likely would've resulted in removal of the t-tube, anterior TM still very atrophic and partially retracted with crust over it, the crust and cerumen was removed with a straight pick and alligator which she said made her ear feel better, middle ear otherwise aerated.    Audiogram:  Right normal downsloping to mild/moderate conductive hearing loss which represents a slight worsening of the high frequency conductive hearing loss from preop, 96% speech discrimination, high volume flat tympanogram.  Left normal/mild downsloping to mild conductive hearing loss which is stable, 100% speech discrimination, high volume flat tympanogram.    Assessment and plan:  Improved bilateral middle ear aeration with t-tube placement.  She was pleased.  We discussed that she likely will need long term external aeration and she understands that.  I'd like to see her again in 4 months as the left anterior retraction will need to be cleared of debris regularly.

## 2019-11-20 NOTE — PATIENT INSTRUCTIONS
1.  You were seen in the ENT Clinic today by .  If you have any questions or concerns after your appointment, please call [phone]. Press option #1 for scheduling related needs. Press option #3 for Nurse advice.    2.  Plan is to return to clinic in 4 months, no audio needed.      Keily Alex LPN  [facility] - Otolaryngology

## 2019-11-20 NOTE — LETTER
11/20/2019      RE: Trupti Mcbride  [address] [address]       Trupti Mcbride is seen for her first postoperative visit. She is s/p bilateral myringotomy with T-tube placement on 10/23/2019.  She had deep retraction pockets     Physical examination:  female in no acute distress.  Alert and answering questions appropriately.  HB 1/6 bilaterally.  Both ears examined under the microscope.  Right t-tube in position and open, mesotympanum aerated with a band that may still be attached to the long process of the incus but no retractions or squamous buildup, moderate anterior epitympanic retraction pocket which appears aerated, remainder of the middle ear aerated.  Left ear canal with dried blood, a little was removed from the lumen of the t-tube which was not fully obstructed, the remainder of the dried blood around the t-tube was not manipulated as it likely would've resulted in removal of the t-tube, anterior TM still very atrophic and partially retracted with crust over it, the crust and cerumen was removed with a straight pick and alligator which she said made her ear feel better, middle ear otherwise aerated.    Audiogram:  Right normal downsloping to mild/moderate conductive hearing loss which represents a slight worsening of the high frequency conductive hearing loss from preop, 96% speech discrimination, high volume flat tympanogram.  Left normal/mild downsloping to mild conductive hearing loss which is stable, 100% speech discrimination, high volume flat tympanogram.    Assessment and plan:  Improved bilateral middle ear aeration with t-tube placement.  She was pleased.  We discussed that she likely will need long term external aeration and she understands that.  I'd like to see her again in 4 months as the left anterior retraction will need to be cleared of debris regularly.        Sommer Live MD

## 2019-11-20 NOTE — PROGRESS NOTES
AUDIOLOGY REPORT    SUMMARY: Audiology visit completed. See audiogram for results.      RECOMMENDATIONS: Follow-up with ENT.    Eveline Charles, [facility]  Licensed Audiologist  [place] #[id number]

## 2020-02-13 ENCOUNTER — DOCUMENTATION ONLY (OUTPATIENT)
Dept: CARE COORDINATION | Facility: CLINIC | Age: 60
End: 2020-02-13

## 2020-03-11 ENCOUNTER — HEALTH MAINTENANCE LETTER (OUTPATIENT)
Age: 60
End: 2020-03-11

## 2020-03-18 ENCOUNTER — OFFICE VISIT (OUTPATIENT)
Dept: OTOLARYNGOLOGY | Facility: CLINIC | Age: 60
End: 2020-03-18
Payer: COMMERCIAL

## 2020-03-18 VITALS — SYSTOLIC BLOOD PRESSURE: 142 MMHG | DIASTOLIC BLOOD PRESSURE: 87 MMHG | HEART RATE: 101 BPM

## 2020-03-18 DIAGNOSIS — H69.93 DYSFUNCTION OF BOTH EUSTACHIAN TUBES: Primary | ICD-10-CM

## 2020-03-18 ASSESSMENT — PAIN SCALES - GENERAL: PAINLEVEL: NO PAIN (0)

## 2020-03-18 NOTE — PATIENT INSTRUCTIONS
1. You were seen in the ENT Clinic today by .  If you have any questions or concerns after your appointment, please call   - Option 1: ENT Clinic: [phone]  - Option 2: Cecilia ('s Nurse): [phone]    2.   Plan to return to clinic in 4 months without a new hearing test.     KEY Brooks  [facility] Otolaryngology  [phone]

## 2020-03-18 NOTE — PROGRESS NOTES
Trupti Mcbride is seen for ear checks.  She has bilateral t-tubes and an left anterior retraction pocket which collects cerumen and debris.  She reports her ears feel fine.  She saw her primary last month for an annual physical and he saw her right tube but said there was too much wax on the left to see it.    Physical examination:  female in no acute distress.  Alert and answering questions appropriately.  HB 1/6 bilaterally.  Both ears examined under the microscope.  Right t-tube in position and open, TM still pexied onto the long process of the incus, no other retraction pockets noted.  Left t-tube extruded in the ear canal surrounded by dried clot, it was removed, the TM is intact with an area of monomer just inferior to the retraction pocket where the t-tube had been placed previously, there is a stable anterior superior retraction pocket which is clean, there is some air underneath the pocket and the remainder of the middle ear is aerated.    Procedure:  We discussed left myringotomy and t-tube placement.  Time Out was performed with confirmation of the patient, site and procedure.  Left ear examined under the microscope.  Phenol placed on the anterior inferior quadrant and myringotomy incision made, middle ear clear.  T-tube placed.  She tolerated the procedure without difficulty.    Assessment and plan:  We'll see her back in 4 months for another ear check.

## 2020-03-18 NOTE — LETTER
3/18/2020      RE: Trupti Mcbride  [address] [address]       Trupti Mcbride is seen for ear checks.  She has bilateral t-tubes and an left anterior retraction pocket which collects cerumen and debris.  She reports her ears feel fine.  She saw her primary last month for an annual physical and he saw her right tube but said there was too much wax on the left to see it.    Physical examination:  female in no acute distress.  Alert and answering questions appropriately.  HB 1/6 bilaterally.  Both ears examined under the microscope.  Right t-tube in position and open, TM still pexied onto the long process of the incus, no other retraction pockets noted.  Left t-tube extruded in the ear canal surrounded by dried clot, it was removed, the TM is intact with an area of monomer just inferior to the retraction pocket where the t-tube had been placed previously, there is a stable anterior superior retraction pocket which is clean, there is some air underneath the pocket and the remainder of the middle ear is aerated.    Procedure:  We discussed left myringotomy and t-tube placement.  Time Out was performed with confirmation of the patient, site and procedure.  Left ear examined under the microscope.  Phenol placed on the anterior inferior quadrant and myringotomy incision made, middle ear clear.  T-tube placed.  She tolerated the procedure without difficulty.    Assessment and plan:  We'll see her back in 4 months for another ear check.      Sommer Live MD

## 2020-07-15 ENCOUNTER — OFFICE VISIT (OUTPATIENT)
Dept: OTOLARYNGOLOGY | Facility: CLINIC | Age: 60
End: 2020-07-15
Payer: COMMERCIAL

## 2020-07-15 VITALS
OXYGEN SATURATION: 98 % | WEIGHT: 153 LBS | HEART RATE: 100 BPM | TEMPERATURE: 97 F | BODY MASS INDEX: 23.96 KG/M2 | SYSTOLIC BLOOD PRESSURE: 148 MMHG | DIASTOLIC BLOOD PRESSURE: 89 MMHG

## 2020-07-15 DIAGNOSIS — H69.93 DYSFUNCTION OF BOTH EUSTACHIAN TUBES: Primary | ICD-10-CM

## 2020-07-15 ASSESSMENT — PAIN SCALES - GENERAL: PAINLEVEL: NO PAIN (0)

## 2020-07-15 NOTE — PATIENT INSTRUCTIONS
1. You were seen in the ENT Clinic today by .  If you have any questions or concerns after your appointment, please call   - Option 1: ENT Clinic: [phone]  - Option 2: Cecilia ('s Nurse): [phone]    2.   Plan to return to clinic in 6 months without a new hearing test.     KEY Brooks  Care Coordinator  [facility] Otolaryngology  [phone]

## 2020-07-15 NOTE — LETTER
7/15/2020      RE: Trupti Mcbride  [address] [address]       Chief Complaint   Patient presents with     RECHECK     4 mth f/u     Blood pressure (!) 148/89, pulse 100, temperature 97  F (36.1  C), temperature source Temporal, weight 69.4 kg (153 lb), SpO2 98 %, not currently breastfeeding.    Senait Pretty, JEROMEN      Trupti Mcbride is seen for ear checks.  She has bilateral t-tubes and an left anterior retraction pocket which collects cerumen and debris.  One month ago she had minimum right sided otalgia without otorrhea or changes in hearing. Duration 2-3 days and resolved without intervention. Bilaterally no changes in hearing, aural fullness, otorrhea, dizziness, changes in facial motion/sensation, taste.     Physical examination:  female in no acute distress.  Alert and answering questions appropriately.  HB 1/6 bilaterally. V1-V3 intact bilaterally.   Both ears examined under the microscope:   Right ear - EAC without lesions/otorrhea. T-tube with circumferential crusting, lumen patent, superior flange appears to be bulging out of TM which is similar to the last exam. TM retracted onto long process of the incus, no other retraction pockets noted. Middle ear aerated.    Left ear -  EAC without lesions/otorrhea. T-tube with circumferential crusting, lumen patent. TM retracted anteriorly superiorly with aerated middle ear which is also unchanged.     Assessment and plan:  No new otologic concerns. Bilateral T-tubes in place stable exam.     - RTC 6mo for T-tube check   - RTC sooner if symptomatic (decrease in hearing, aural fullness, etc.)    ~ Patient seen and examined with staff surgeon Dr. Sommer Live.    MD ADRIAN Lee, Sommer Live MD, saw this patient with the resident/fellow and agree with the resident/fellow s findings and plan of care as documented in the resident s/fellow s note. I was present for the entire procedure.    MD Sommer Gan,  MD

## 2020-07-15 NOTE — PROGRESS NOTES
Trupti Mcbride is seen for ear checks.  She has bilateral t-tubes and an left anterior retraction pocket which collects cerumen and debris.  One month ago she had minimum right sided otalgia without otorrhea or changes in hearing. Duration 2-3 days and resolved without intervention. Bilaterally no changes in hearing, aural fullness, otorrhea, dizziness, changes in facial motion/sensation, taste.     Physical examination:  female in no acute distress.  Alert and answering questions appropriately.  HB 1/6 bilaterally. V1-V3 intact bilaterally.   Both ears examined under the microscope:   Right ear - EAC without lesions/otorrhea. T-tube with circumferential crusting, lumen patent, superior flange appears to be bulging out of TM which is similar to the last exam. TM retracted onto long process of the incus, no other retraction pockets noted. Middle ear aerated.    Left ear -  EAC without lesions/otorrhea. T-tube with circumferential crusting, lumen patent. TM retracted anteriorly superiorly with aerated middle ear which is also unchanged.     Assessment and plan:  No new otologic concerns. Bilateral T-tubes in place stable exam.     - RTC 6mo for T-tube check   - RTC sooner if symptomatic (decrease in hearing, aural fullness, etc.)    ~ Patient seen and examined with staff surgeon Dr. Sommer Live.    MD ADRIAN Lee, Sommer Live MD, saw this patient with the resident/fellow and agree with the resident/fellow s findings and plan of care as documented in the resident s/fellow s note. I was present for the entire procedure.    Sommer Live MD

## 2020-07-15 NOTE — Clinical Note
7/15/2020       RE: Trupti Mcbride  [address] [address]     Dear Colleague,    Thank you for referring your patient, Trupti Mcbride, to the [facility] EAR NOSE AND THROAT at [facility]. Please see a copy of my visit note below.    Chief Complaint   Patient presents with     RECHECK     4 mth f/u     Blood pressure (!) 148/89, pulse 100, temperature 97  F (36.1  C), temperature source Temporal, weight 69.4 kg (153 lb), SpO2 98 %, not currently breastfeeding.    Senait Pretty LPN      Trupti Mcbride is seen for ear checks.  She has bilateral t-tubes and an left anterior retraction pocket which collects cerumen and debris.  One month ago minimum right sided otalgia without otorrhea, changes in hearing. Duration 2-3 days and resolved without intervention. Bilaterally no changes in hearing, aural fullness, otorrhea, dizziness, changes in facial motion/sensation, taste.     Review of systems:  10 point review of systems negative.     Physical examination:  female in no acute distress.  Alert and answering questions appropriately.  HB 1/6 bilaterally. V1-V3 intact bilaterally. PEERL.  Both ears examined under the microscope.    Right ear - EAC without lesions/ottorhea/skin changes. T-tube with circumferential crusting, lumen patent, superior phalange appears to be bulging out of TM. TM retracted onto long process of the incus, no other retraction pockets noted. Middle ear aerated.    Left ear -  EAC without lesions/ottorhea/skin changes. T-tube with circumferential crusting, lumen patent. TM retracted anteriorly superiorly with aerated middle ear .       Assessment and plan:  No new otologic concerns. Bilateral T-tubes in place with right T-tube superior phalange threatening to come out.     - RTC 6mo for T-tube check   - RTC sooner if symptomatic (decrease in hearing, aural fullness, etc.)    ~ Patient seen and examined with staff surgeon Dr. Novoa  Calos.    Elmira Tran MD    Again, thank you for allowing me to participate in the care of your patient.      Sincerely,    Sommer Live MD

## 2021-01-03 ENCOUNTER — HEALTH MAINTENANCE LETTER (OUTPATIENT)
Age: 61
End: 2021-01-03

## 2021-01-18 NOTE — PATIENT INSTRUCTIONS
1. You were seen in the ENT Clinic today by Dr. Live.  If you have any questions or concerns after your appointment, please call   - Option 1: ENT Clinic: [phone]   - Option 2: Senait (Dr. Live's Nurse): [phone]  2.   Plan to return to clinic TBRILEY Pretty LPN  [facility] - Otolaryngology

## 2021-01-19 ENCOUNTER — DOCUMENTATION ONLY (OUTPATIENT)
Dept: CARE COORDINATION | Facility: CLINIC | Age: 61
End: 2021-01-19

## 2021-01-20 ENCOUNTER — OFFICE VISIT (OUTPATIENT)
Dept: OTOLARYNGOLOGY | Facility: CLINIC | Age: 61
End: 2021-01-20
Payer: COMMERCIAL

## 2021-01-20 VITALS
HEIGHT: 67 IN | SYSTOLIC BLOOD PRESSURE: 124 MMHG | HEART RATE: 92 BPM | BODY MASS INDEX: 23.7 KG/M2 | RESPIRATION RATE: 17 BRPM | OXYGEN SATURATION: 99 % | WEIGHT: 151 LBS | TEMPERATURE: 98.8 F | DIASTOLIC BLOOD PRESSURE: 81 MMHG

## 2021-01-20 DIAGNOSIS — H69.91 DYSFUNCTION OF RIGHT EUSTACHIAN TUBE: Primary | ICD-10-CM

## 2021-01-20 DIAGNOSIS — H69.92 DYSFUNCTION OF LEFT EUSTACHIAN TUBE: ICD-10-CM

## 2021-01-20 PROCEDURE — 69433 CREATE EARDRUM OPENING: CPT | Mod: RT | Performed by: OTOLARYNGOLOGY

## 2021-01-20 PROCEDURE — 99214 OFFICE O/P EST MOD 30 MIN: CPT | Mod: 25 | Performed by: OTOLARYNGOLOGY

## 2021-01-20 RX ORDER — OFLOXACIN 3 MG/ML
5 SOLUTION AURICULAR (OTIC) 2 TIMES DAILY
Qty: 5 ML | Refills: 0 | Status: SHIPPED | OUTPATIENT
Start: 2021-01-20 | End: 2021-01-27

## 2021-01-20 RX ORDER — DEXAMETHASONE SODIUM PHOSPHATE 4 MG/ML
10 INJECTION, SOLUTION INTRA-ARTICULAR; INTRALESIONAL; INTRAMUSCULAR; INTRAVENOUS; SOFT TISSUE ONCE
Status: CANCELLED | OUTPATIENT
Start: 2021-01-20 | End: 2021-01-20

## 2021-01-20 RX ORDER — CEFAZOLIN SODIUM 1 G/50ML
1 INJECTION, SOLUTION INTRAVENOUS SEE ADMIN INSTRUCTIONS
Status: CANCELLED | OUTPATIENT
Start: 2021-01-20

## 2021-01-20 RX ORDER — CEFAZOLIN SODIUM 2 G/50ML
2 SOLUTION INTRAVENOUS
Status: CANCELLED | OUTPATIENT
Start: 2021-01-20

## 2021-01-20 ASSESSMENT — PAIN SCALES - GENERAL: PAINLEVEL: NO PAIN (0)

## 2021-01-20 ASSESSMENT — MIFFLIN-ST. JEOR: SCORE: 1297.56

## 2021-01-20 NOTE — PROGRESS NOTES
Trupti Mcbride is seen for bilateral ear checks. She reports her right ear feels quite full but her left ear feels fine. No otalgia or otorrhea.    Physical examination:  female in no acute distress.  Alert and answering questions appropriately.  HB 1/6 bilaterally.  Both ears examined under the microscope. Right t-tube has extruded and was removed. The TM is intact with a stable anterior as well as a mesotympanic retraction that is pexied onto the long process of the incus, her TM is quite thin throughout. Left t-tube remains in position but there is a significant amount of crusting around the base, middle ear is aerated.    Assessment and plan:  Extruded right t-tube with worsening retraction. We discussed placement of a new t-tube in clinic versus returning to the operating room for a cartilage backed tympanoplasty with t-tube placement. She elected to proceed with t-tube placement in clinic.    Procedure:  Time Out was performed with confirmation of the patient, site and procedure. The left ear was first examined and the crust painted with phenol in an attempt to loosen it. The right ear was then examined. Phenol was placed on the anterior quadrant. Myringotomy incision was made. T-tube was placed. She had a difficult time tolerating it but the placement was successful. The left ear was again examined and the crust remained firm and adhered to the tube.    Due to the thinness of her TMs, she does not retain PE tubes for much time. We again discussed cartilage tympanoplasty with t-tube placement as well as cleaning of the left t-tube. We also discussed the possibility of simultaneous left cartilage tympanoplasty with t-tube placement given she has some pexy on the left as well but no evidence of erosion or cholesteatoma at the last surgery. The risks and benefits were discussed.  The risks include but are not limited to:  Worsened hearing which may require further surgery, profound and irreversible hearing loss,  dizziness, damage to the taste nerve, damage to the facial nerve, tympanic membrane perforation requiring further surgery and infection.  Postoperative restrictions were discussed. She understands that the hearing in both ears will be quite down due to packing in the ears. If she does elect to proceed, she wants her activity restrictions to end before March as she has taken almost the last year off due to Covid and she is due to return in March. Our surgery scheduler will contact her.

## 2021-01-20 NOTE — NURSING NOTE
"Chief Complaint   Patient presents with     RECHECK     follow up      Blood pressure 124/81, pulse 92, temperature 98.8  F (37.1  C), resp. rate 17, height 1.702 m (5' 7\"), weight 68.5 kg (151 lb), SpO2 99 %, not currently breastfeeding.    Edgardo Dominguez LPN    "

## 2021-01-20 NOTE — LETTER
1/20/2021      RE: Trupti Mcbride  [address] [address]       Trupti Mcbride is seen for bilateral ear checks. She reports her right ear feels quite full but her left ear feels fine. No otalgia or otorrhea.    Physical examination:  female in no acute distress.  Alert and answering questions appropriately.  HB 1/6 bilaterally.  Both ears examined under the microscope. Right t-tube has extruded and was removed. The TM is intact with a stable anterior as well as a mesotympanic retraction that is pexied onto the long process of the incus, her TM is quite thin throughout. Left t-tube remains in position but there is a significant amount of crusting around the base, middle ear is aerated.    Assessment and plan:  Extruded right t-tube with worsening retraction. We discussed placement of a new t-tube in clinic versus returning to the operating room for a cartilage backed tympanoplasty with t-tube placement. She elected to proceed with t-tube placement in clinic.    Procedure:  Time Out was performed with confirmation of the patient, site and procedure. The left ear was first examined and the crust painted with phenol in an attempt to loosen it. The right ear was then examined. Phenol was placed on the anterior quadrant. Myringotomy incision was made. T-tube was placed. She had a difficult time tolerating it but the placement was successful. The left ear was again examined and the crust remained firm and adhered to the tube.    Due to the thinness of her TMs, she does not retain PE tubes for much time. We again discussed cartilage tympanoplasty with t-tube placement as well as cleaning of the left t-tube. We also discussed the possibility of simultaneous left cartilage tympanoplasty with t-tube placement given she has some pexy on the left as well but no evidence of erosion or cholesteatoma at the last surgery. The risks and benefits were discussed.  The risks include but are not limited to:   Worsened hearing which may require further surgery, profound and irreversible hearing loss, dizziness, damage to the taste nerve, damage to the facial nerve, tympanic membrane perforation requiring further surgery and infection.  Postoperative restrictions were discussed. She understands that the hearing in both ears will be quite down due to packing in the ears. If she does elect to proceed, she wants her activity restrictions to end before March as she has taken almost the last year off due to Covid and she is due to return in March. Our surgery scheduler will contact her.      Sommer Live MD

## 2021-01-21 ENCOUNTER — TELEPHONE (OUTPATIENT)
Dept: OTOLARYNGOLOGY | Facility: CLINIC | Age: 61
End: 2021-01-21

## 2021-01-21 NOTE — TELEPHONE ENCOUNTER
Left message regarding scheduling surgery with Dr. Live. Call back number left on voicemail. [phone].       Lizz Guerra   Perioperative Coordinator  Department of Otolaryngology    Office: [phone]

## 2021-01-25 PROBLEM — H69.93 DYSFUNCTION OF BOTH EUSTACHIAN TUBES: Status: ACTIVE | Noted: 2021-01-25

## 2021-01-25 NOTE — TELEPHONE ENCOUNTER
FUTURE VISIT INFORMATION      SURGERY INFORMATION:    Date:21    Location: [facility] OR     Surgeon:  Sommer Live    Anesthesia Type:  General    Procedure: BILATERAL TYMPANOPLASTY WITH CARTILAGE BACKING    Consult: 21    RECORDS REQUESTED FROM:     Most recent EKG+ Tracin18 [facility]

## 2021-01-26 NOTE — TELEPHONE ENCOUNTER
Patient called back to schedule surgery     Surgeon: Dr. Live   Date of Surgery: 02/12/2021  Location of surgery: [facility]  Pre-Op H&P: PAC / patient can't get into pre-op   Post-Op Appt Date: 3 weeks    Imaging needed:  No  Discussed COVID-19 testing:  Yes  Pre-cert/Authorization completed:  No  Packet sent out: In clinic     Patient aware that pre-op and covid test will be done the same day per patient request. She has no further questions at this time.

## 2021-01-29 DIAGNOSIS — Z11.59 ENCOUNTER FOR SCREENING FOR OTHER VIRAL DISEASES: ICD-10-CM

## 2021-02-09 ENCOUNTER — ANESTHESIA EVENT (OUTPATIENT)
Dept: SURGERY | Facility: AMBULATORY SURGERY CENTER | Age: 61
End: 2021-02-09

## 2021-02-09 ENCOUNTER — OFFICE VISIT (OUTPATIENT)
Dept: SURGERY | Facility: CLINIC | Age: 61
End: 2021-02-09
Payer: COMMERCIAL

## 2021-02-09 ENCOUNTER — PRE VISIT (OUTPATIENT)
Dept: SURGERY | Facility: CLINIC | Age: 61
End: 2021-02-09

## 2021-02-09 VITALS
RESPIRATION RATE: 16 BRPM | BODY MASS INDEX: 23.54 KG/M2 | DIASTOLIC BLOOD PRESSURE: 79 MMHG | HEIGHT: 67 IN | SYSTOLIC BLOOD PRESSURE: 118 MMHG | TEMPERATURE: 98.3 F | WEIGHT: 150 LBS | HEART RATE: 86 BPM | OXYGEN SATURATION: 97 %

## 2021-02-09 DIAGNOSIS — Z01.818 PRE-OP EVALUATION: Primary | ICD-10-CM

## 2021-02-09 DIAGNOSIS — H69.93 DYSFUNCTION OF BOTH EUSTACHIAN TUBES: ICD-10-CM

## 2021-02-09 DIAGNOSIS — Z11.59 ENCOUNTER FOR SCREENING FOR OTHER VIRAL DISEASES: ICD-10-CM

## 2021-02-09 DIAGNOSIS — Z01.818 PRE-OP EVALUATION: ICD-10-CM

## 2021-02-09 LAB
ANION GAP SERPL CALCULATED.3IONS-SCNC: 8 MMOL/L (ref 3–14)
BUN SERPL-MCNC: 11 MG/DL (ref 7–30)
CALCIUM SERPL-MCNC: 9.7 MG/DL (ref 8.5–10.1)
CHLORIDE SERPL-SCNC: 110 MMOL/L (ref 94–109)
CO2 SERPL-SCNC: 22 MMOL/L (ref 20–32)
CREAT SERPL-MCNC: 0.66 MG/DL (ref 0.52–1.04)
ERYTHROCYTE [DISTWIDTH] IN BLOOD BY AUTOMATED COUNT: 12.7 % (ref 10–15)
GFR SERPL CREATININE-BSD FRML MDRD: >90 ML/MIN/{1.73_M2}
GLUCOSE SERPL-MCNC: 109 MG/DL (ref 70–99)
HCT VFR BLD AUTO: 42.4 % (ref 35–47)
HGB BLD-MCNC: 14.3 G/DL (ref 11.7–15.7)
LABORATORY COMMENT REPORT: NORMAL
MCH RBC QN AUTO: 30.3 PG (ref 26.5–33)
MCHC RBC AUTO-ENTMCNC: 33.7 G/DL (ref 31.5–36.5)
MCV RBC AUTO: 90 FL (ref 78–100)
PLATELET # BLD AUTO: 371 10E9/L (ref 150–450)
POTASSIUM SERPL-SCNC: 4 MMOL/L (ref 3.4–5.3)
RBC # BLD AUTO: 4.72 10E12/L (ref 3.8–5.2)
SARS-COV-2 RNA RESP QL NAA+PROBE: NEGATIVE
SARS-COV-2 RNA RESP QL NAA+PROBE: NORMAL
SODIUM SERPL-SCNC: 140 MMOL/L (ref 133–144)
SPECIMEN SOURCE: NORMAL
SPECIMEN SOURCE: NORMAL
WBC # BLD AUTO: 8.1 10E9/L (ref 4–11)

## 2021-02-09 PROCEDURE — 99203 OFFICE O/P NEW LOW 30 MIN: CPT | Performed by: PHYSICIAN ASSISTANT

## 2021-02-09 PROCEDURE — 85027 COMPLETE CBC AUTOMATED: CPT | Performed by: PATHOLOGY

## 2021-02-09 PROCEDURE — U0005 INFEC AGEN DETEC AMPLI PROBE: HCPCS | Mod: 90 | Performed by: PATHOLOGY

## 2021-02-09 PROCEDURE — U0003 INFECTIOUS AGENT DETECTION BY NUCLEIC ACID (DNA OR RNA); SEVERE ACUTE RESPIRATORY SYNDROME CORONAVIRUS 2 (SARS-COV-2) (CORONAVIRUS DISEASE [COVID-19]), AMPLIFIED PROBE TECHNIQUE, MAKING USE OF HIGH THROUGHPUT TECHNOLOGIES AS DESCRIBED BY CMS-2020-01-R: HCPCS | Mod: 90 | Performed by: PATHOLOGY

## 2021-02-09 PROCEDURE — 99000 SPECIMEN HANDLING OFFICE-LAB: CPT | Performed by: PATHOLOGY

## 2021-02-09 PROCEDURE — 36415 COLL VENOUS BLD VENIPUNCTURE: CPT | Performed by: PATHOLOGY

## 2021-02-09 PROCEDURE — 80048 BASIC METABOLIC PNL TOTAL CA: CPT | Performed by: PATHOLOGY

## 2021-02-09 RX ORDER — ACETAMINOPHEN 325 MG/1
325-650 TABLET ORAL EVERY 6 HOURS PRN
COMMUNITY
End: 2023-06-07

## 2021-02-09 ASSESSMENT — COPD QUESTIONNAIRES: COPD: 1

## 2021-02-09 ASSESSMENT — MIFFLIN-ST. JEOR: SCORE: 1293.03

## 2021-02-09 ASSESSMENT — PAIN SCALES - GENERAL: PAINLEVEL: NO PAIN (0)

## 2021-02-09 ASSESSMENT — LIFESTYLE VARIABLES: TOBACCO_USE: 1

## 2021-02-09 NOTE — H&P
Pre-Operative H & P     CC:  Preoperative exam to assess for increased cardiopulmonary risk while undergoing surgery and anesthesia.    Date of Encounter: 2/9/2021  Primary Care Physician:  No primary care provider on file.  associated diagnosis:  Dysfunction of both eustachian tubes     HPI  Trupti Mcbride is a 60 year old female who presents for pre-operative H & P in preparation for BILATERAL TYMPANOPLASTY WITH CARTILAGE BACKING with Dr. Live on 2/12/21 at [facility]. Patient is being evaluated for comorbid conditions of dyslipidemia, COPD, tobacco use disorder, anxiety, depression     Ms. Mcbride has a history of eustachian tube dysfunction. She follows with Dr. Live. She was seen earlier today and both ears were examined. Due to worsening of right T-tube retraction, the above procedure is now planned.      History is obtained from the patient and chart review.      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, underdeveloped right hand, webbing left hand     COPD (chronic obstructive pulmonary disease) (H)      Dysfunction of both eustachian tubes      Hypercholesterolemia      Tobacco dependence        Past Surgical History  Past Surgical History:   Procedure Laterality Date     FOOT SURGERY Right as child    Surgery for club foot X 3     Hand surgery Bilateral as child     MYRINGOTOMY, INSERT TUBE BILATERAL, COMBINED Bilateral 10/23/2019    Procedure: Bilateral Myringotomy and Tympanostomy Tube Placement;  Surgeon: Sommer Live MD;  Location: [facility] OR       Hx of Blood transfusions/reactions: denies     Hx of abnormal bleeding or anti-platelet use: denies    Menstrual history: No LMP recorded. Patient is postmenopausal.    Steroid use in the last year: denies    Personal or FH with difficulty with Anesthesia:  denies    Prior to Admission Medications  Current Outpatient Medications   Medication  Sig Dispense Refill     acetaminophen (TYLENOL) 325 MG tablet Take 325-650 mg by mouth every 6 hours as needed for mild pain       albuterol (PROVENTIL) (2.5 MG/3ML) 0.083% neb solution Inhale 2.5 mg into the lungs as needed        atorvastatin (LIPITOR) 20 MG tablet Take 20 mg by mouth every morning        calcium carb-cholecalciferol (CALCIUM 500 + D3) 500-200 MG-UNIT tablet Take 2 tablets by mouth every morning        Multiple Vitamins-Minerals (CENTROVITE) TABS Take 1 tablet by mouth every morning        tetrahydrozoline (VISINE) 0.05 % ophthalmic solution Place 1 drop into both eyes as needed       tiotropium (SPIRIVA) 18 MCG inhaled capsule Inhale 1 capsule into the lungs every evening        WIXELA INHUB 250-50 MCG/DOSE inhaler Inhale 1 puff into the lungs 2 times daily          Allergies  No Known Allergies    Social History  Social History     Socioeconomic History     Marital status:      Spouse name: Not on file     Number of children: Not on file     Years of education: Not on file     Highest education level: Not on file   Occupational History     Not on file   Social Needs     Financial resource strain: Not on file     Food insecurity     Worry: Not on file     Inability: Not on file     Transportation needs     Medical: Not on file     Non-medical: Not on file   Tobacco Use     Smoking status: Light Tobacco Smoker     Packs/day: 0.50     Years: 35.00     Pack years: 17.50     Types: Cigarettes     Smokeless tobacco: Never Used     Tobacco comment: now 0-2 cigs daily   Substance and Sexual Activity     Alcohol use: Yes     Comment: occ     Drug use: Not Currently     Types: Marijuana     Sexual activity: Not Currently     Partners: Male     Birth control/protection: None   Lifestyle     Physical activity     Days per week: Not on file     Minutes per session: Not on file     Stress: Not on file   Relationships     Social connections     Talks on phone: Not on file     Gets together: Not on file  "    Attends Zoroastrianism service: Not on file     Active member of club or organization: Not on file     Attends meetings of clubs or organizations: Not on file     Relationship status: Not on file     Intimate partner violence     Fear of current or ex partner: Not on file     Emotionally abused: Not on file     Physically abused: Not on file     Forced sexual activity: Not on file   Other Topics Concern     Not on file   Social History Narrative     Not on file       Family History  Family History   Problem Relation Age of Onset     Unknown/Adopted Other      Heart Disease Father      Hypertension Father      Hypertension Mother      Hypertension Sister      Anesthesia Reaction No family hx of      Deep Vein Thrombosis (DVT) No family hx of        ROS/MED HX  ENT/Pulmonary: Comment: Eustachian tube dysfunction     (+) tobacco use, COPD,     Neurologic:  - neg neurologic ROS     Cardiovascular:     (+) Dyslipidemia ----- (-) taking anticoagulants/antiplatelets   METS/Exercise Tolerance:     Hematologic:  - neg hematologic  ROS  (-) history of blood transfusion   Musculoskeletal:  - neg musculoskeletal ROS     GI/Hepatic:  - neg GI/hepatic ROS     Renal/Genitourinary:  - neg Renal ROS     Endo:  - neg endo ROS     Psychiatric/Substance Use:     (+) psychiatric history anxiety     Infectious Disease:  - neg infectious disease ROS     Malignancy:  - neg malignancy ROS     Other:  - neg other ROS              The complete review of systems is negative other than noted in the HPI or here.   Temp: 98.3  F (36.8  C) Temp src: Oral BP: 118/79 Pulse: 86   Resp: 16 SpO2: 97 %         150 lbs 0 oz  5' 7\"[pt reported[   Body mass index is 23.49 kg/m .       Physical Exam  Constitutional: Awake, alert, cooperative, no apparent distress, and appears stated age.  Eyes: Pupils equal, round and reactive to light, extra ocular muscles intact, sclera clear, conjunctiva normal.  HENT: Normocephalic, oral pharynx with moist mucus " membranes, good dentition.   Respiratory: Clear to auscultation bilaterally, no crackles or wheezing.  Cardiovascular: Regular rate and rhythm, normal S1 and S2, and no murmur noted.  Carotids no bruits. No edema. Palpable pulses to radial arteries.   GI: Normal bowel sounds, soft, non-distended, non-tender  Genitourinary:  deferred  Skin: Warm and dry.    Musculoskeletal: Full ROM of neck. There is no redness, warmth, or swelling of the exposed joints.   Neurologic: Awake, alert, oriented to name, place and time. Cranial nerves II-XII are grossly intact. Gait is normal.   Neuropsychiatric: Calm, cooperative. Normal affect.     Labs: (personally reviewed)  Component      Latest Ref Rng & Units 2/9/2021   Sodium      133 - 144 mmol/L 140   Potassium      3.4 - 5.3 mmol/L 4.0   Chloride      94 - 109 mmol/L 110 (H)   Carbon Dioxide      20 - 32 mmol/L 22   Anion Gap      3 - 14 mmol/L 8   Glucose      70 - 99 mg/dL 109 (H)   Urea Nitrogen      7 - 30 mg/dL 11   Creatinine      0.52 - 1.04 mg/dL 0.66   GFR Estimate      >60 mL/min/1.73:m2 >90   GFR Estimate If Black      >60 mL/min/1.73:m2 >90   Calcium      8.5 - 10.1 mg/dL 9.7   WBC      4.0 - 11.0 10e9/L 8.1   RBC Count      3.8 - 5.2 10e12/L 4.72   Hemoglobin      11.7 - 15.7 g/dL 14.3   Hematocrit      35.0 - 47.0 % 42.4   MCV      78 - 100 fl 90   MCH      26.5 - 33.0 pg 30.3   MCHC      31.5 - 36.5 g/dL 33.7   RDW      10.0 - 15.0 % 12.7   Platelet Count      150 - 450 10e9/L 371       EKG 2018   SR with artifact        Outside records reviewed from: care everywhere    ASSESSMENT and PLAN  Trupti Mcbride is a 60 year old female scheduled for BILATERAL TYMPANOPLASTY WITH CARTILAGE BACKING on 2/12/21 by Dr. Live in treatment of eustachian tube dysfunction.  PAC referral for risk assessment and optimization for anesthesia with comorbid conditions of dyslipidemia, COPD, tobacco use disorder, anxiety, depression:     Pre-operative considerations:     1.  Cardiac:   Functional status- METS >4. dyslipidemia using Lipitor. No reported cardiac history. denies cardiac symptoms. EKG 2018 showed SR, artifact.  low risk surgery with 0.4% (RCRI #) risk of major adverse cardiac event.     2.  Pulm:  Airway feasible.  RUMA risk: low. current tobacco use- patient is down to 2 cigarettes daily. Encouraged smoking cessation and educated not to smoke DOS. Patient states she has not smoked yet today and is planning on stopping now through surgery. COPD using albuterol, Spiriva and xixela. Follows with pulmonology- most recently seen 2/8/21. Stable. COVID testing per surgery team.      3.  GI:  Risk of PONV score = 1.  If > 2, anti-emetic intervention recommended.     4.  psych: anxiety and depression. Patient no longer follows with a therapist. She reports symptoms started after her mother passed away, and last week a friend passed away causing increased symptoms. She states she has good support and feels she has people to discuss her feeling with. She declines behavioral health referral today.     5. ENT: eustachian tube dysfunction with the above procedure planned.      VTE risk: 0.26%     Patient is optimized and is acceptable candidate for the proposed procedure.  No further diagnostic evaluation is needed.     30 minutes were spent completing chart review, seeing the patient, reviewing labs and test results and completing documentation today    Melissa Naranjo PA-C  Preoperative Assessment Center  [facility]  Phone: [phone]  Fax: [phone]

## 2021-02-09 NOTE — H&P (VIEW-ONLY)
Pre-Operative H & P     CC:  Preoperative exam to assess for increased cardiopulmonary risk while undergoing surgery and anesthesia.    Date of Encounter: 2/9/2021  Primary Care Physician:  No primary care provider on file.  associated diagnosis:  Dysfunction of both eustachian tubes     HPI  Trupti Mcbride is a 60 year old female who presents for pre-operative H & P in preparation for BILATERAL TYMPANOPLASTY WITH CARTILAGE BACKING with Dr. Live on 2/12/21 at [facility]. Patient is being evaluated for comorbid conditions of dyslipidemia, COPD, tobacco use disorder, anxiety, depression     Ms. Mcbride has a history of eustachian tube dysfunction. She follows with Dr. Live. She was seen earlier today and both ears were examined. Due to worsening of right T-tube retraction, the above procedure is now planned.      History is obtained from the patient and chart review.      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, underdeveloped right hand, webbing left hand     COPD (chronic obstructive pulmonary disease) (H)      Dysfunction of both eustachian tubes      Hypercholesterolemia      Tobacco dependence        Past Surgical History  Past Surgical History:   Procedure Laterality Date     FOOT SURGERY Right as child    Surgery for club foot X 3     Hand surgery Bilateral as child     MYRINGOTOMY, INSERT TUBE BILATERAL, COMBINED Bilateral 10/23/2019    Procedure: Bilateral Myringotomy and Tympanostomy Tube Placement;  Surgeon: Sommer Live MD;  Location: [facility] OR       Hx of Blood transfusions/reactions: denies     Hx of abnormal bleeding or anti-platelet use: denies    Menstrual history: No LMP recorded. Patient is postmenopausal.    Steroid use in the last year: denies    Personal or FH with difficulty with Anesthesia:  denies    Prior to Admission Medications  Current Outpatient Medications   Medication  Sig Dispense Refill     acetaminophen (TYLENOL) 325 MG tablet Take 325-650 mg by mouth every 6 hours as needed for mild pain       albuterol (PROVENTIL) (2.5 MG/3ML) 0.083% neb solution Inhale 2.5 mg into the lungs as needed        atorvastatin (LIPITOR) 20 MG tablet Take 20 mg by mouth every morning        calcium carb-cholecalciferol (CALCIUM 500 + D3) 500-200 MG-UNIT tablet Take 2 tablets by mouth every morning        Multiple Vitamins-Minerals (CENTROVITE) TABS Take 1 tablet by mouth every morning        tetrahydrozoline (VISINE) 0.05 % ophthalmic solution Place 1 drop into both eyes as needed       tiotropium (SPIRIVA) 18 MCG inhaled capsule Inhale 1 capsule into the lungs every evening        WIXELA INHUB 250-50 MCG/DOSE inhaler Inhale 1 puff into the lungs 2 times daily          Allergies  No Known Allergies    Social History  Social History     Socioeconomic History     Marital status:      Spouse name: Not on file     Number of children: Not on file     Years of education: Not on file     Highest education level: Not on file   Occupational History     Not on file   Social Needs     Financial resource strain: Not on file     Food insecurity     Worry: Not on file     Inability: Not on file     Transportation needs     Medical: Not on file     Non-medical: Not on file   Tobacco Use     Smoking status: Light Tobacco Smoker     Packs/day: 0.50     Years: 35.00     Pack years: 17.50     Types: Cigarettes     Smokeless tobacco: Never Used     Tobacco comment: now 0-2 cigs daily   Substance and Sexual Activity     Alcohol use: Yes     Comment: occ     Drug use: Not Currently     Types: Marijuana     Sexual activity: Not Currently     Partners: Male     Birth control/protection: None   Lifestyle     Physical activity     Days per week: Not on file     Minutes per session: Not on file     Stress: Not on file   Relationships     Social connections     Talks on phone: Not on file     Gets together: Not on file  "    Attends Church service: Not on file     Active member of club or organization: Not on file     Attends meetings of clubs or organizations: Not on file     Relationship status: Not on file     Intimate partner violence     Fear of current or ex partner: Not on file     Emotionally abused: Not on file     Physically abused: Not on file     Forced sexual activity: Not on file   Other Topics Concern     Not on file   Social History Narrative     Not on file       Family History  Family History   Problem Relation Age of Onset     Unknown/Adopted Other      Heart Disease Father      Hypertension Father      Hypertension Mother      Hypertension Sister      Anesthesia Reaction No family hx of      Deep Vein Thrombosis (DVT) No family hx of        ROS/MED HX  ENT/Pulmonary: Comment: Eustachian tube dysfunction     (+) tobacco use, COPD,     Neurologic:  - neg neurologic ROS     Cardiovascular:     (+) Dyslipidemia ----- (-) taking anticoagulants/antiplatelets   METS/Exercise Tolerance:     Hematologic:  - neg hematologic  ROS  (-) history of blood transfusion   Musculoskeletal:  - neg musculoskeletal ROS     GI/Hepatic:  - neg GI/hepatic ROS     Renal/Genitourinary:  - neg Renal ROS     Endo:  - neg endo ROS     Psychiatric/Substance Use:     (+) psychiatric history anxiety     Infectious Disease:  - neg infectious disease ROS     Malignancy:  - neg malignancy ROS     Other:  - neg other ROS              The complete review of systems is negative other than noted in the HPI or here.   Temp: 98.3  F (36.8  C) Temp src: Oral BP: 118/79 Pulse: 86   Resp: 16 SpO2: 97 %         150 lbs 0 oz  5' 7\"[pt reported[   Body mass index is 23.49 kg/m .       Physical Exam  Constitutional: Awake, alert, cooperative, no apparent distress, and appears stated age.  Eyes: Pupils equal, round and reactive to light, extra ocular muscles intact, sclera clear, conjunctiva normal.  HENT: Normocephalic, oral pharynx with moist mucus " membranes, good dentition.   Respiratory: Clear to auscultation bilaterally, no crackles or wheezing.  Cardiovascular: Regular rate and rhythm, normal S1 and S2, and no murmur noted.  Carotids no bruits. No edema. Palpable pulses to radial arteries.   GI: Normal bowel sounds, soft, non-distended, non-tender  Genitourinary:  deferred  Skin: Warm and dry.    Musculoskeletal: Full ROM of neck. There is no redness, warmth, or swelling of the exposed joints.   Neurologic: Awake, alert, oriented to name, place and time. Cranial nerves II-XII are grossly intact. Gait is normal.   Neuropsychiatric: Calm, cooperative. Normal affect.     Labs: (personally reviewed)  Component      Latest Ref Rng & Units 2/9/2021   Sodium      133 - 144 mmol/L 140   Potassium      3.4 - 5.3 mmol/L 4.0   Chloride      94 - 109 mmol/L 110 (H)   Carbon Dioxide      20 - 32 mmol/L 22   Anion Gap      3 - 14 mmol/L 8   Glucose      70 - 99 mg/dL 109 (H)   Urea Nitrogen      7 - 30 mg/dL 11   Creatinine      0.52 - 1.04 mg/dL 0.66   GFR Estimate      >60 mL/min/1.73:m2 >90   GFR Estimate If Black      >60 mL/min/1.73:m2 >90   Calcium      8.5 - 10.1 mg/dL 9.7   WBC      4.0 - 11.0 10e9/L 8.1   RBC Count      3.8 - 5.2 10e12/L 4.72   Hemoglobin      11.7 - 15.7 g/dL 14.3   Hematocrit      35.0 - 47.0 % 42.4   MCV      78 - 100 fl 90   MCH      26.5 - 33.0 pg 30.3   MCHC      31.5 - 36.5 g/dL 33.7   RDW      10.0 - 15.0 % 12.7   Platelet Count      150 - 450 10e9/L 371       EKG 2018   SR with artifact        Outside records reviewed from: care everywhere    ASSESSMENT and PLAN  Trupti Mcbride is a 60 year old female scheduled for BILATERAL TYMPANOPLASTY WITH CARTILAGE BACKING on 2/12/21 by Dr. Live in treatment of eustachian tube dysfunction.  PAC referral for risk assessment and optimization for anesthesia with comorbid conditions of dyslipidemia, COPD, tobacco use disorder, anxiety, depression:     Pre-operative considerations:     1.  Cardiac:   Functional status- METS >4. dyslipidemia using Lipitor. No reported cardiac history. denies cardiac symptoms. EKG 2018 showed SR, artifact.  low risk surgery with 0.4% (RCRI #) risk of major adverse cardiac event.     2.  Pulm:  Airway feasible.  RUMA risk: low. current tobacco use- patient is down to 2 cigarettes daily. Encouraged smoking cessation and educated not to smoke DOS. Patient states she has not smoked yet today and is planning on stopping now through surgery. COPD using albuterol, Spiriva and xixela. Follows with pulmonology- most recently seen 2/8/21. Stable. COVID testing per surgery team.      3.  GI:  Risk of PONV score = 1.  If > 2, anti-emetic intervention recommended.     4.  psych: anxiety and depression. Patient no longer follows with a therapist. She reports symptoms started after her mother passed away, and last week a friend passed away causing increased symptoms. She states she has good support and feels she has people to discuss her feeling with. She declines behavioral health referral today.     5. ENT: eustachian tube dysfunction with the above procedure planned.      VTE risk: 0.26%     Patient is optimized and is acceptable candidate for the proposed procedure.  No further diagnostic evaluation is needed.     30 minutes were spent completing chart review, seeing the patient, reviewing labs and test results and completing documentation today    Melissa Naranjo PA-C  Preoperative Assessment Center  [facility]  Phone: [phone]  Fax: [phone]

## 2021-02-09 NOTE — ANESTHESIA PREPROCEDURE EVALUATION
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, underdeveloped right hand, webbing left hand     COPD (chronic obstructive pulmonary disease) (H)      Dysfunction of both eustachian tubes      Hypercholesterolemia      Tobacco dependence       Past Surgical History:   Procedure Laterality Date     FOOT SURGERY Right as child    Surgery for club foot X 3     Hand surgery Bilateral as child     MYRINGOTOMY, INSERT TUBE BILATERAL, COMBINED Bilateral 10/23/2019    Procedure: Bilateral Myringotomy and Tympanostomy Tube Placement;  Surgeon: Sommer Live MD;  Location:  OR      No Known Allergies   Social History     Tobacco Use     Smoking status: Light Tobacco Smoker     Packs/day: 0.50     Years: 35.00     Pack years: 17.50     Types: Cigarettes     Smokeless tobacco: Never Used     Tobacco comment: now 0-2 cigs daily   Substance Use Topics     Alcohol use: Yes     Comment: occ      Wt Readings from Last 1 Encounters:   21 68.5 kg (151 lb)        Anesthesia Evaluation   Pt has had prior anesthetic.     No history of anesthetic complications       ROS/MED HX  ENT/Pulmonary: Comment: Eustachian tube dysfunction     (+) tobacco use (1-2 cigarettes daily, will stop between now and surgery), COPD,     Neurologic:  - neg neurologic ROS     Cardiovascular:     (+) Dyslipidemia ----- (-) taking anticoagulants/antiplatelets   METS/Exercise Tolerance: >4 METS    Hematologic:  - neg hematologic  ROS  (-) history of blood transfusion   Musculoskeletal:  - neg musculoskeletal ROS     GI/Hepatic:  - neg GI/hepatic ROS     Renal/Genitourinary:  - neg Renal ROS     Endo:  - neg endo ROS     Psychiatric/Substance  Use:     (+) psychiatric history anxiety     Infectious Disease:  - neg infectious disease ROS     Malignancy:  - neg malignancy ROS     Other:  - neg other ROS          Physical Exam    Airway  airway exam normal      Mallampati: I   TM distance: > 3 FB   Neck ROM: full   Mouth opening: > 3 cm    Respiratory Devices and Support         Dental  no notable dental history         Cardiovascular   cardiovascular exam normal       Rhythm and rate: regular and normal     Pulmonary   pulmonary exam normal        breath sounds clear to auscultation           OUTSIDE LABS:  CBC: No results found for: WBC, HGB, HCT, PLT  BMP: No results found for: NA, POTASSIUM, CHLORIDE, CO2, BUN, CR, GLC  COAGS: No results found for: PTT, INR, FIBR  POC: No results found for: BGM, HCG, HCGS  HEPATIC: No results found for: ALBUMIN, PROTTOTAL, ALT, AST, GGT, ALKPHOS, BILITOTAL, BILIDIRECT, HAZEL  OTHER: No results found for: PH, LACT, A1C, WARD, PHOS, MAG, LIPASE, AMYLASE, TSH, T4, T3, CRP, SED    Anesthesia Plan    ASA Status:  2   NPO Status:  NPO Appropriate    Anesthesia Type: General.     - Airway: ETT   Induction: Intravenous, Propofol.   Maintenance: Balanced.        Consents    Anesthesia Plan(s) and associated risks, benefits, and realistic alternatives discussed. Questions answered and patient/representative(s) expressed understanding.     - Discussed with:  Patient         Postoperative Care    Pain management: IV analgesics, Oral pain medications, Multi-modal analgesia.   PONV prophylaxis: Ondansetron (or other 5HT-3), Dexamethasone or Solumedrol     Comments:              PAC Discussion and Assessment    ASA Classification: 3  Case is suitable for: ASC  Anesthetic techniques and relevant risks discussed: GA                  PAC Resident/NP Anesthesia Assessment: Trupti Mcbride is a 60 year old female scheduled for BILATERAL TYMPANOPLASTY WITH CARTILAGE BACKING on 2/12/21 by Dr. Live in treatment of eustachian tube dysfunction.   PAC referral for risk assessment and optimization for anesthesia with comorbid conditions of dyslipidemia, COPD, tobacco use disorder, anxiety, depression:     Pre-operative considerations:     1.  Cardiac:  Functional status- METS >4. dyslipidemia using Lipitor. No reported cardiac history. denies cardiac symptoms. EKG 2018 showed SR, artifact.  low risk surgery with 0.4% (RCRI #) risk of major adverse cardiac event.     2.  Pulm:  Airway feasible.  RUMA risk: low. current tobacco use- patient is down to 2 cigarettes daily. Encouraged smoking cessation and educated not to smoke DOS. Patient states she has not smoked yet today and is planning on stopping now through surgery. COPD using albuterol, Spiriva and xixela. Follows with pulmonology- most recently seen 2/8/21. Stable. COVID testing per surgery team.      3.  GI:  Risk of PONV score = 1.  If > 2, anti-emetic intervention recommended.     4.  psych: anxiety and depression. Patient no longer follows with a therapist. She reports symptoms started after her mother passed away, and last week a friend passed away causing increased symptoms. She states she has good support and feels she has people to discuss her feeling with. She declines behavioral health referral today.     5. ENT: eustachian tube dysfunction with the above procedure planned.      VTE risk: 0.26%     Patient is optimized and is acceptable candidate for the proposed procedure.  No further diagnostic evaluation is needed.       **For further details of assessment, testing, and physical exam please see H and P completed on same date.       PINO Fine PA-C

## 2021-02-09 NOTE — PATIENT INSTRUCTIONS
Preparing for Your Surgery      Name:  Trupti Mcbride   MRN:  [id number]   :  1960   Today's Date:  2021         Arriving for surgery:  Surgery date:  21  Arrival time:  10 am    Restrictions due to COVID 19:  One consistent visitor is allowed per patient  No ill visitors  All visitors must wear face mask     parking is available for anyone with mobility limitations or disabilities. (Monday- Friday 7 am- 5 pm)    Please come to:    [facility]  [address] [place] [id number]    Please check in on the 5th floor at the Ambulatory Surgery Center       What can I eat or drink?    -  You may eat and drink normally until 8 hours before surgery. (Until 3:30 am)  -  You may have clear liquids up to 4 hours before surgery. (Until 7:30 am)  Examples of clear liquids:  Water  Clear broth  Juices (apple, white grape, white cranberry  and cider) without pulp  Noncarbonated, powder based beverages  (lemonade and Og-Aid)  Sodas (Sprite, 7-Up, ginger ale and seltzer)  Coffee or tea (without milk or cream)  Gatorade    --No alcohol for at least 24 hours before surgery    Which medicines can I take?    Hold Aspirin for 7 days before surgery.   * Hold Multivitamins for 7 days before surgery. Hold Multivitamin starting now, if you have not already.  Hold Supplements for 7 days before surgery.  Hold Ibuprofen (Advil, Motrin) for 1 day before surgery--unless otherwise directed by surgeon.  Hold Naproxen (Aleve) for 4 days before surgery.    -  DO NOT take the following medications the day of surgery:  Calcium Carbonate-Vitamin D3,     -  PLEASE TAKE the following medications the day of surgery   Atorvastatin (Lipitor), Wixela Inhub inhaler,  Albuterol neb if needed  Acetaminophen (Tylenol) if needed  Visine eye drops if needed    How do I prepare myself?  -  Bring inhalers.  - Please take 2 showers before surgery using Scrubcare or Hibiclens soap.    Use this soap only from  the neck to your toes.     Leave the soap on your skin for one minute--then rinse thoroughly.      You may use your own shampoo and conditioner; no other hair products.   - Please remove all jewelry and body piercings.  - No lotions, deodorants or fragrance.  - No makeup or fingernail polish.   - Bring your ID and insurance card.        - All patients are required to have a Covid-19 test within 4 days of surgery/procedure.      -Patients will be contacted by the [facility] scheduling team within 1 week of surgery to make an appointment.      - Patients may call the Scheduling team at [phone] if they have not been scheduled within 4 days of  surgery.      ALL PATIENTS ARE REQUIRED TO HAVE A RESPONSIBLE ADULT TO DRIVE AND BE IN ATTENDANCE WITH THEM FOR 24 HOURS FOLLOWING SURGERY       Questions or Concerns:    -For questions regarding the day of surgery please contact the Ambulatory Surgery Center at [phone].    -If you have health changes between today and your surgery please contact your surgeon.     For questions after surgery please call your surgeons office.    AFTER YOUR SURGERY  Breathing exercises   Breathing exercises help you recover faster. Take deep breaths and let the air out slowly. This will:     Help you wake up after surgery.    Help prevent complications like pneumonia.  Preventing complications will help you go home sooner.   Nausea and vomiting   You may feel sick to your stomach after surgery; if so, let your nurse know.    Pain control:  After surgery, you may have pain. Our goal is to help you manage your pain. Pain medicine will help you feel comfortable enough to do activities that will help you heal.  These activities may include breathing exercises, walking and physical therapy.   To help your health care team treat your pain we will ask: 1) If you have pain  2) where it is located 3) describe your pain in your words  Methods of pain control include medications given by  mouth, vein or by nerve block for some surgeries.  Sequential Compression Device (SCD):  You may need to wear SCD S (also called pneumo boots)on your legs or feet. These are wraps connected to a machine that pumps in air and releases it. The repeated pumping helps prevent blood clots from forming.

## 2021-02-12 ENCOUNTER — HOSPITAL ENCOUNTER (OUTPATIENT)
Facility: AMBULATORY SURGERY CENTER | Age: 61
Discharge: HOME | End: 2021-02-12
Attending: OTOLARYNGOLOGY | Admitting: OTOLARYNGOLOGY
Payer: COMMERCIAL

## 2021-02-12 ENCOUNTER — ANESTHESIA (OUTPATIENT)
Dept: SURGERY | Facility: AMBULATORY SURGERY CENTER | Age: 61
End: 2021-02-12

## 2021-02-12 VITALS
RESPIRATION RATE: 16 BRPM | TEMPERATURE: 98.4 F | DIASTOLIC BLOOD PRESSURE: 64 MMHG | BODY MASS INDEX: 22.76 KG/M2 | SYSTOLIC BLOOD PRESSURE: 101 MMHG | HEIGHT: 67 IN | HEART RATE: 80 BPM | WEIGHT: 145 LBS | OXYGEN SATURATION: 92 %

## 2021-02-12 DIAGNOSIS — H69.93 DYSFUNCTION OF BOTH EUSTACHIAN TUBES: ICD-10-CM

## 2021-02-12 DIAGNOSIS — G89.18 POSTOPERATIVE PAIN: Primary | ICD-10-CM

## 2021-02-12 DIAGNOSIS — H69.91 DYSFUNCTION OF RIGHT EUSTACHIAN TUBE: ICD-10-CM

## 2021-02-12 PROCEDURE — 21235 EAR CARTILAGE GRAFT: CPT | Mod: 59

## 2021-02-12 PROCEDURE — 69631 REPAIR EARDRUM STRUCTURES: CPT | Mod: 50

## 2021-02-12 RX ORDER — HYDROCODONE BITARTRATE AND ACETAMINOPHEN 5; 325 MG/1; MG/1
1 TABLET ORAL
Status: DISCONTINUED | OUTPATIENT
Start: 2021-02-12 | End: 2021-02-13 | Stop reason: HOSPADM

## 2021-02-12 RX ORDER — NALOXONE HYDROCHLORIDE 0.4 MG/ML
0.4 INJECTION, SOLUTION INTRAMUSCULAR; INTRAVENOUS; SUBCUTANEOUS
Status: DISCONTINUED | OUTPATIENT
Start: 2021-02-12 | End: 2021-02-13 | Stop reason: HOSPADM

## 2021-02-12 RX ORDER — OXYCODONE HYDROCHLORIDE 5 MG/1
5 TABLET ORAL EVERY 4 HOURS PRN
Status: DISCONTINUED | OUTPATIENT
Start: 2021-02-12 | End: 2021-02-13 | Stop reason: HOSPADM

## 2021-02-12 RX ORDER — MEPERIDINE HYDROCHLORIDE 25 MG/ML
12.5 INJECTION INTRAMUSCULAR; INTRAVENOUS; SUBCUTANEOUS
Status: DISCONTINUED | OUTPATIENT
Start: 2021-02-12 | End: 2021-02-13 | Stop reason: HOSPADM

## 2021-02-12 RX ORDER — CEFAZOLIN SODIUM 2 G/50ML
2 SOLUTION INTRAVENOUS
Status: COMPLETED | OUTPATIENT
Start: 2021-02-12 | End: 2021-02-12

## 2021-02-12 RX ORDER — DEXAMETHASONE SODIUM PHOSPHATE 10 MG/ML
10 INJECTION, SOLUTION INTRAMUSCULAR; INTRAVENOUS ONCE
Status: DISCONTINUED | OUTPATIENT
Start: 2021-02-12 | End: 2021-02-12 | Stop reason: HOSPADM

## 2021-02-12 RX ORDER — HYDROCODONE BITARTRATE AND ACETAMINOPHEN 5; 325 MG/1; MG/1
1 TABLET ORAL EVERY 6 HOURS PRN
Qty: 8 TABLET | Refills: 0 | Status: SHIPPED | OUTPATIENT
Start: 2021-02-12 | End: 2021-10-28

## 2021-02-12 RX ORDER — ONDANSETRON 2 MG/ML
4 INJECTION INTRAMUSCULAR; INTRAVENOUS EVERY 30 MIN PRN
Status: DISCONTINUED | OUTPATIENT
Start: 2021-02-12 | End: 2021-02-13 | Stop reason: HOSPADM

## 2021-02-12 RX ORDER — ACETAMINOPHEN 325 MG/1
975 TABLET ORAL ONCE
Status: COMPLETED | OUTPATIENT
Start: 2021-02-12 | End: 2021-02-12

## 2021-02-12 RX ORDER — FENTANYL CITRATE 50 UG/ML
25-50 INJECTION, SOLUTION INTRAMUSCULAR; INTRAVENOUS
Status: DISCONTINUED | OUTPATIENT
Start: 2021-02-12 | End: 2021-02-12 | Stop reason: HOSPADM

## 2021-02-12 RX ORDER — SODIUM CHLORIDE, SODIUM LACTATE, POTASSIUM CHLORIDE, CALCIUM CHLORIDE 600; 310; 30; 20 MG/100ML; MG/100ML; MG/100ML; MG/100ML
INJECTION, SOLUTION INTRAVENOUS CONTINUOUS
Status: DISCONTINUED | OUTPATIENT
Start: 2021-02-12 | End: 2021-02-12 | Stop reason: HOSPADM

## 2021-02-12 RX ORDER — FENTANYL CITRATE 50 UG/ML
INJECTION, SOLUTION INTRAMUSCULAR; INTRAVENOUS PRN
Status: DISCONTINUED | OUTPATIENT
Start: 2021-02-12 | End: 2021-02-12

## 2021-02-12 RX ORDER — ACETAMINOPHEN 325 MG/1
650 TABLET ORAL
Status: DISCONTINUED | OUTPATIENT
Start: 2021-02-12 | End: 2021-02-13 | Stop reason: HOSPADM

## 2021-02-12 RX ORDER — GLYCOPYRROLATE 0.2 MG/ML
INJECTION, SOLUTION INTRAMUSCULAR; INTRAVENOUS PRN
Status: DISCONTINUED | OUTPATIENT
Start: 2021-02-12 | End: 2021-02-12

## 2021-02-12 RX ORDER — ONDANSETRON 2 MG/ML
INJECTION INTRAMUSCULAR; INTRAVENOUS PRN
Status: DISCONTINUED | OUTPATIENT
Start: 2021-02-12 | End: 2021-02-12

## 2021-02-12 RX ORDER — NALOXONE HYDROCHLORIDE 0.4 MG/ML
0.2 INJECTION, SOLUTION INTRAMUSCULAR; INTRAVENOUS; SUBCUTANEOUS
Status: DISCONTINUED | OUTPATIENT
Start: 2021-02-12 | End: 2021-02-13 | Stop reason: HOSPADM

## 2021-02-12 RX ORDER — LIDOCAINE HYDROCHLORIDE 20 MG/ML
INJECTION, SOLUTION INFILTRATION; PERINEURAL PRN
Status: DISCONTINUED | OUTPATIENT
Start: 2021-02-12 | End: 2021-02-12

## 2021-02-12 RX ORDER — SODIUM CHLORIDE, SODIUM LACTATE, POTASSIUM CHLORIDE, CALCIUM CHLORIDE 600; 310; 30; 20 MG/100ML; MG/100ML; MG/100ML; MG/100ML
INJECTION, SOLUTION INTRAVENOUS CONTINUOUS PRN
Status: DISCONTINUED | OUTPATIENT
Start: 2021-02-12 | End: 2021-02-12

## 2021-02-12 RX ORDER — ONDANSETRON 4 MG/1
4 TABLET, ORALLY DISINTEGRATING ORAL EVERY 30 MIN PRN
Status: DISCONTINUED | OUTPATIENT
Start: 2021-02-12 | End: 2021-02-13 | Stop reason: HOSPADM

## 2021-02-12 RX ORDER — SODIUM CHLORIDE, SODIUM LACTATE, POTASSIUM CHLORIDE, CALCIUM CHLORIDE 600; 310; 30; 20 MG/100ML; MG/100ML; MG/100ML; MG/100ML
INJECTION, SOLUTION INTRAVENOUS CONTINUOUS
Status: DISCONTINUED | OUTPATIENT
Start: 2021-02-12 | End: 2021-02-13 | Stop reason: HOSPADM

## 2021-02-12 RX ORDER — PROPOFOL 10 MG/ML
INJECTION, EMULSION INTRAVENOUS CONTINUOUS PRN
Status: DISCONTINUED | OUTPATIENT
Start: 2021-02-12 | End: 2021-02-12

## 2021-02-12 RX ORDER — DEXAMETHASONE SODIUM PHOSPHATE 4 MG/ML
INJECTION, SOLUTION INTRA-ARTICULAR; INTRALESIONAL; INTRAMUSCULAR; INTRAVENOUS; SOFT TISSUE PRN
Status: DISCONTINUED | OUTPATIENT
Start: 2021-02-12 | End: 2021-02-12

## 2021-02-12 RX ORDER — OFLOXACIN 3 MG/ML
5 SOLUTION AURICULAR (OTIC) 2 TIMES DAILY
Qty: 10 ML | Refills: 0 | Status: SHIPPED | OUTPATIENT
Start: 2021-02-19 | End: 2021-03-12

## 2021-02-12 RX ORDER — EPHEDRINE SULFATE 50 MG/ML
INJECTION, SOLUTION INTRAMUSCULAR; INTRAVENOUS; SUBCUTANEOUS PRN
Status: DISCONTINUED | OUTPATIENT
Start: 2021-02-12 | End: 2021-02-12

## 2021-02-12 RX ORDER — BACITRACIN 500 [USP'U]/G
OINTMENT OPHTHALMIC PRN
Status: DISCONTINUED | OUTPATIENT
Start: 2021-02-12 | End: 2021-02-12 | Stop reason: HOSPADM

## 2021-02-12 RX ORDER — GABAPENTIN 300 MG/1
300 CAPSULE ORAL ONCE
Status: COMPLETED | OUTPATIENT
Start: 2021-02-12 | End: 2021-02-12

## 2021-02-12 RX ORDER — CEFAZOLIN SODIUM 1 G/50ML
1 SOLUTION INTRAVENOUS SEE ADMIN INSTRUCTIONS
Status: DISCONTINUED | OUTPATIENT
Start: 2021-02-12 | End: 2021-02-12 | Stop reason: HOSPADM

## 2021-02-12 RX ORDER — PROPOFOL 10 MG/ML
INJECTION, EMULSION INTRAVENOUS PRN
Status: DISCONTINUED | OUTPATIENT
Start: 2021-02-12 | End: 2021-02-12

## 2021-02-12 RX ORDER — LIDOCAINE 40 MG/G
CREAM TOPICAL
Status: DISCONTINUED | OUTPATIENT
Start: 2021-02-12 | End: 2021-02-12 | Stop reason: HOSPADM

## 2021-02-12 RX ADMIN — PROPOFOL 130 MG: 10 INJECTION, EMULSION INTRAVENOUS at 12:33

## 2021-02-12 RX ADMIN — GABAPENTIN 300 MG: 300 CAPSULE ORAL at 10:27

## 2021-02-12 RX ADMIN — SODIUM CHLORIDE, SODIUM LACTATE, POTASSIUM CHLORIDE, CALCIUM CHLORIDE: 600; 310; 30; 20 INJECTION, SOLUTION INTRAVENOUS at 10:41

## 2021-02-12 RX ADMIN — OXYCODONE HYDROCHLORIDE 5 MG: 5 TABLET ORAL at 15:16

## 2021-02-12 RX ADMIN — SODIUM CHLORIDE, SODIUM LACTATE, POTASSIUM CHLORIDE, CALCIUM CHLORIDE: 600; 310; 30; 20 INJECTION, SOLUTION INTRAVENOUS at 12:27

## 2021-02-12 RX ADMIN — CEFAZOLIN SODIUM 2 G: 2 SOLUTION INTRAVENOUS at 12:50

## 2021-02-12 RX ADMIN — DEXAMETHASONE SODIUM PHOSPHATE 10 MG: 4 INJECTION, SOLUTION INTRA-ARTICULAR; INTRALESIONAL; INTRAMUSCULAR; INTRAVENOUS; SOFT TISSUE at 12:40

## 2021-02-12 RX ADMIN — PROPOFOL 40 MG: 10 INJECTION, EMULSION INTRAVENOUS at 13:58

## 2021-02-12 RX ADMIN — LIDOCAINE HYDROCHLORIDE 20 MG: 20 INJECTION, SOLUTION INFILTRATION; PERINEURAL at 12:42

## 2021-02-12 RX ADMIN — FENTANYL CITRATE 50 MCG: 50 INJECTION, SOLUTION INTRAMUSCULAR; INTRAVENOUS at 12:33

## 2021-02-12 RX ADMIN — Medication 0.5 MG: at 14:09

## 2021-02-12 RX ADMIN — FENTANYL CITRATE 50 MCG: 50 INJECTION, SOLUTION INTRAMUSCULAR; INTRAVENOUS at 12:40

## 2021-02-12 RX ADMIN — LIDOCAINE HYDROCHLORIDE 40 MG: 20 INJECTION, SOLUTION INFILTRATION; PERINEURAL at 12:33

## 2021-02-12 RX ADMIN — CEFAZOLIN SODIUM 1 G: 2 SOLUTION INTRAVENOUS at 14:50

## 2021-02-12 RX ADMIN — ONDANSETRON 4 MG: 2 INJECTION INTRAMUSCULAR; INTRAVENOUS at 12:40

## 2021-02-12 RX ADMIN — ACETAMINOPHEN 975 MG: 325 TABLET ORAL at 10:27

## 2021-02-12 RX ADMIN — EPHEDRINE SULFATE 10 MG: 50 INJECTION, SOLUTION INTRAMUSCULAR; INTRAVENOUS; SUBCUTANEOUS at 13:59

## 2021-02-12 RX ADMIN — GLYCOPYRROLATE 0.2 MG: 0.2 INJECTION, SOLUTION INTRAMUSCULAR; INTRAVENOUS at 12:56

## 2021-02-12 RX ADMIN — PROPOFOL 150 MCG/KG/MIN: 10 INJECTION, EMULSION INTRAVENOUS at 12:33

## 2021-02-12 ASSESSMENT — MIFFLIN-ST. JEOR: SCORE: 1270.35

## 2021-02-12 NOTE — ANESTHESIA POSTPROCEDURE EVALUATION
Patient: Trupti Mcbride    Procedure(s):  BILATERAL TYMPANOPLASTY WITH CARTILAGE BACKING. T-Tube removal and placement of T-tube left ear    Diagnosis:Dysfunction of both eustachian tubes [H69.83]  Diagnosis Additional Information: No value filed.    Anesthesia Type:  General    Note:  Disposition: Outpatient   Postop Pain Control: Uneventful            Sign Out: Well controlled pain   PONV:    Neuro/Psych: Uneventful            Sign Out: Acceptable/Baseline neuro status   Airway/Respiratory: Uneventful            Sign Out: Acceptable/Baseline resp. status   CV/Hemodynamics: Uneventful            Sign Out: Acceptable CV status   Other NRE:    DID A NON-ROUTINE EVENT OCCUR?          Last vitals:  Vitals:    02/12/21 1503 02/12/21 1511 02/12/21 1522   BP: 130/65 109/70 109/72   Pulse: 81 78 80   Resp: 16 11 16   Temp: 37.1  C (98.8  F) 37.5  C (99.5  F)    SpO2: 99% 96% 92%       Last vitals prior to Anesthesia Care Transfer:  CRNA VITALS  2/12/2021 1432 - 2/12/2021 1530      2/12/2021             EKG:  Sinus rhythm          Electronically Signed By: Junior Burton MD  February 12, 2021  3:30 PM

## 2021-02-12 NOTE — ANESTHESIA CARE TRANSFER NOTE
Patient: Trupti Mcbride    Procedure(s):  BILATERAL TYMPANOPLASTY WITH CARTILAGE BACKING. T-Tube removal and placement of T-tube left ear    Diagnosis: Dysfunction of both eustachian tubes [H69.83]  Diagnosis Additional Information: No value filed.    Anesthesia Type:   General     Note:    Oropharynx: oropharynx clear of all foreign objects  Level of Consciousness: awake  Oxygen Supplementation: face mask    Independent Airway: airway patency satisfactory and stable  Dentition: dentition unchanged  Vital Signs Stable: post-procedure vital signs reviewed and stable  Report to RN Given: handoff report given  Patient transferred to: PACU    Handoff Report: Identifed the Patient, Identified the Reponsible Provider, Reviewed the pertinent medical history, Discussed the surgical course, Reviewed Intra-OP anesthesia mangement and issues during anesthesia, Set expectations for post-procedure period and Allowed opportunity for questions and acknowledgement of understanding      Vitals: (Last set prior to Anesthesia Care Transfer)  CRNA VITALS  2/12/2021 1435 - 2/12/2021 1505      2/12/2021             Pulse:  73    SpO2:  98 %    Resp Rate (observed):  12        Electronically Signed By: ANDERSON Loza CRNA  February 12, 2021  3:05 PM

## 2021-02-12 NOTE — DISCHARGE INSTRUCTIONS
"1. Resume your home medications. Take pain medications as indicated. Use docusate to avoid constipation. Start your ear drops in 1 week after surgery and use until your follow up.    2. Wound care  * Change the cotton ball in your ear as needed for drainage.  It's normal to expect some drainage from your ear for the first few days after surgery.    3. Use a cotton ball coated with vasoline to keep water out of ear canal.    4. For the next week, no heavy lifting more than 5 pounds, no strenuous activities. No nose blowing. Sneeze with your mouth open.    5. Please call MD or come to the ED for shortness of breath, trouble breathing, inability to tolerate liquids, intractable dizziness, or signs of infection such as fevers or redness.      Call the [phone] clinic number if you have any questions and concerns during the day and call [phone] at night and ask for \"ENT resident on call\".     6. Follow up with Dr. Live as previously scheduled.      [facility]  Home Care Following Anesthesia  For 24 hours after surgery:  1. Get plenty of rest.  A responsible adult must stay with you for at least 24 hours after you leave the surgery center.  2. Do not drive or use heavy equipment.  If you have weakness or tingling, don't drive or use heavy equipment until this feeling goes away.   3. Do not drink alcohol.   4. Avoid strenuous or risky activities.  Ask for help when climbing stairs.  5. You may feel lightheaded.  IF so, sit for a few minutes before standing.  Have someone help you get up.   6. If you have nausea (feel sick to your stomach): Drink only clear liquids such as apple juice, ginger ale, broth or 7-Up.  Rest may also help.  Be sure to drink enough fluids.  Move to a regular diet as you feel able.   7. You may have a slight fever.  Call the doctor if your fever is over 100 F (37.7 C) (taken under the tongue) or lasts longer than 24 hours.  8. You may have a dry mouth, " "a sore throat, muscle aches or trouble sleeping. These should go away after 24 hours.  9. Do not make important or legal decisions.        Today you received a Marcaine or bupivacaine block to numb the nerves near your surgery site.  This is a block using local anesthetic or \"numbing\" medication injected around the nerves to anesthetize or \"numb\" the area supplied by those nerves.  This block is injected into the muscle layer near your surgical site.  The medication may numb the location where you had surgery for 6-18 hours, but may last up to 24 hours.  If your surgical site is an arm or leg you should be careful with your affected limb, since it is possible to injure your limb without being aware of it due to the numbing.  Until full feeling returns, you should guard against bumping or hitting your limb, and avoid extreme hot or cold temperatures on the skin.  As the block wears off, the feeling will return as a tingling or prickly sensation near your surgical site.  You will experience more discomfort from your incision as the feeling returns.  You may want to take a pain pill (a narcotic or Tylenol if this was prescribed by your surgeon) when you start to experience mild pain before the pain beccomes more severe.  If your pain medications do not control your pain you should notifiy your surgeon.    Tips for taking pain medications  To get the best pain relief possible, remember these points:    Take pain medications as directed, before pain becomes severe.    Pain medication can upset your stomach: taking it with food may help.    Constipation is a common side effect of pain medication. Drink plenty of  fluids.    Eat foods high in fiber. Take a stool softener if recommended by your doctor or pharmacist.    Do not drink alcohol, drive or operate machinery while taking pain medications.    Ask about other ways to control pain, such as with heat, ice or relaxation.    Tylenol/Acetaminophen Consumption  To help " encourage the safe use of acetaminophen, the makers of TYLENOL  have lowered the maximum daily dose for single-ingredient Extra Strength TYLENOL  (acetaminophen) products sold in the [place] from 8 pills per day (4,000 mg) to 6 pills per day (3,000 mg). The dosing interval has also changed from 2 pills every 4-6 hours to 2 pills every 6 hours.    If you feel your pain relief is insufficient, you may take Tylenol/Acetaminophen in addition to your narcotic pain medication.     Be careful not to exceed 3,000 mg of Tylenol/Acetaminophen in a 24 hour period from all sources.    If you are taking extra strength Tylenol/acetaminophen (500 mg), the maximum dose is 6 tablets in 24 hours.    If you are taking regular strength acetaminophen (325 mg), the maximum dose is 9 tablets in 24 hours.    Call a doctor for any of the followin. Signs of infection (fever, growing tenderness at the surgery site, a large amount of drainage or bleeding, severe pain, foul-smelling drainage, redness, swelling).  2. It has been over 8 to 10 hours since surgery and you are still not able to urinate (pass water).  3. Headache for over 24 hours.  4. Numbness, tingling or weakness the day after surgery (if you had spinal anesthesia).  5. Signs of Covid-19 infection (temperature over 100 degrees, shortness of breath, cough, loss of taste/smell, generalized body aches, persistent headache, chills, sore throat, nausea/vomiting/diarrhea)  Your doctor is:  Dr. Sommer Live, ENT Otolaryngology: [phone]  Or dial [phone] and ask for the resident on call for:  ENT Otolaryngology  For emergency care, call the:  [facility]:  [phone] (TTY for hearing impaired: [phone])

## 2021-02-12 NOTE — OP NOTE
Date of service:  2/12/21    Preoperative diagnosis:  eustachian tube dysfunction    Postoperative diagnosis:  eustachian tube dysfunction    Procedure:  1.  Bilateral tympanoplasty with cartilage backing  2.  Left myringotomy and t-tube placement  3.  Facial nerve monitoring x 2 hours    Surgeon:  Sommer Live MD    Fellow:  Chun Lemus MD    Resident:  Ana Paula Browning MD    Anesthesia:  General    EBL:  5cc    Specimens:  None    Complications:  None    Findings:  Left t-tube shaft had rotated inferiorly so that the umbo had become disconnected from the TM, TM retracted onto the long process of the incus which was intact, ossicular chain intact. Right TM retracted onto the long process of the incus which was intact, ossicular chain intact.    Indications:  Trupti Mcbride is a patient with bilateral eustachian tube dysfunction with retraction and difficulty retaining PE tubes.  Discussion was had about cartilage tympanoplasty and replac.  Risks and benefits had been discussed and consent had been obtained.    Procedure:  The patient was taken to the operating room and placed supine on the operating room table.  General anesthesia was induced and endotracheal intubation was performed.  Time Out was then performed with confirmation of the patient, site and procedure.  Facial nerve electrodes were placed on both sides of the face.  Impedances were checked and the nerve was monitored for the entirety of the case.  1:100,000 epinephrine was injected into the ear canal and tragus bilaterally.  The left ear was turned up. The area was prepped and draped in standard surgical fashion.  The operating microscope was brought into position and used for the entirety of the case.  The ear canal was inspected and irrigated with normal saline.  The tympanic membrane was inspected.  The t-tube is noted to be in the middle ear with the shaft laying sideways on the TM. The shaft has migrated under the TM over the end of the long  process of the malleus such that the TM is no longer attached to a portion of the long process of the malleus. The t-tube was removed. The middle ear is otherwise fairly well aerated although the TM is slightly retracted onto the end of the long process of the incus. The TM was excised around the end of the malleus where it is no longer attached including where the t-tube base had been resulting in an approximately 50% central perforation around the long process of the malleus. A myringotomy incision was made in the inferior anterior quadrant and a t-tube was placed. Canal incisions were made and the tympanomeatal flap was elevated. The hypotympanum was clear with some mucosal adhesions which were lysed. The TM was adhered to the end of the long proces of the incus which was released. The incus was intact and attached to the stapes. The chorda tympani was identified and preserved. The entire ossicular chain was intact and mobile. Tragal incision was made and an approximately 5mm x 6mm piece of tragal cartilage was harvested and prepared for grafting. The incision was closed with chromic suture. The anterior middle ear was filled with gelfoam and a thin piece of cartilage with perichondrium was placed under the umbo in order to repair the perforation. The edge of the graft was against the t-tube but the lumen was still open. More gelfoam was placed in the middle ear to hold the graft up. The tympanomeatal flap was placed back into position and gelfoam used to cover the graft and seal the incision.  Bacitracin was used to fill the ear canal.  A cotton ball was placed over the ear canal and the drapes removed.    The right ear was then turned up and the area was prepped and draped in standard surgical fashion.  The operating microscope was brought into position and used for the entirety of the case.  The ear canal was inspected and irrigated with normal saline.  The tympanic membrane was inspected.  The t-tube is noted  to be in good position and open with the TM still retracted onto the long process of the incus. Canal incisions were made and a tympanomeatal flap elevated. The hypotympanum is clear. The chorda tympani was identified and preserved. The TM was adhered to the long process of the incus and this was released without any tears. The entire ossicular chain was intact and mobile. Tragal incision was made and an approximately 5mm x 5mm piece of cartilage was harvested and prepared on the back table for grafting. The incision was closed with chromic suture. The middle ear was filled with gelfoam and the graft placed over the mesotympanum to back the mesotympanic segment of the TM. The tympanomeatal flap was placed back into position and gelfoam used to cover the graft and seal the incision.  Bacitracin was used to fill the ear canal.  A cotton ball was placed over the ear canaland the facial nerve electrodes removed.  The patient tolerated the procedure well and counts were correct.  The patient was returned to Anesthesia, awakened, extubated and taken to the PACU in stable condition.    Sommer CAMPBELL MD, was scrubbed during all portions of the procedure.

## 2021-03-02 ENCOUNTER — TELEPHONE (OUTPATIENT)
Dept: OTOLARYNGOLOGY | Facility: CLINIC | Age: 61
End: 2021-03-02

## 2021-03-02 NOTE — TELEPHONE ENCOUNTER
"Patient called regarding her post op appt for tomorrow stating she needs to cancel as \"something has come up\".    Patient was made aware that Dr. Live is booked out and will need to send a message to the clinic team to find availability. Patient is unable to make appt at all tomorrow. Patients appt was cancelled for 3/3 but routed to BRANDYN Sapp and KEY Gleason to find an appt for patient sooner than next available.   Patient will wait to be contacted.       Lizz Guerra on 3/2/2021 at 4:51 PM    "

## 2021-03-04 ENCOUNTER — OFFICE VISIT (OUTPATIENT)
Dept: OTOLARYNGOLOGY | Facility: CLINIC | Age: 61
End: 2021-03-04
Payer: COMMERCIAL

## 2021-03-04 VITALS
WEIGHT: 146.83 LBS | HEIGHT: 67 IN | BODY MASS INDEX: 23.04 KG/M2 | OXYGEN SATURATION: 96 % | HEART RATE: 94 BPM | TEMPERATURE: 96.8 F

## 2021-03-04 DIAGNOSIS — H69.93 DYSFUNCTION OF BOTH EUSTACHIAN TUBES: Primary | ICD-10-CM

## 2021-03-04 PROCEDURE — 99024 POSTOP FOLLOW-UP VISIT: CPT | Performed by: OTOLARYNGOLOGY

## 2021-03-04 RX ORDER — PREDNISOLONE ACETATE 10 MG/ML
SUSPENSION/ DROPS OPHTHALMIC
Qty: 5 ML | Refills: 1 | Status: SHIPPED | OUTPATIENT
Start: 2021-03-04 | End: 2021-03-14

## 2021-03-04 ASSESSMENT — MIFFLIN-ST. JEOR: SCORE: 1273.63

## 2021-03-04 ASSESSMENT — PAIN SCALES - GENERAL: PAINLEVEL: NO PAIN (0)

## 2021-03-04 NOTE — NURSING NOTE
"Chief Complaint   Patient presents with     RECHECK     3 week post op, surgery 2/12       Pulse 94, temperature 96.8  F (36  C), temperature source Temporal, height 1.702 m (5' 7\"), weight 66.6 kg (146 lb 13.2 oz), SpO2 96 %, not currently breastfeeding.    Amelia Kuhn, EMT    "

## 2021-03-04 NOTE — LETTER
3/4/2021      RE: Trupti Mcbride  [address] [facility] [address]       Trupti Mcbride is seen for her first postoperative visit after bilateral cartilage tympanoplasties for eustachian tube dysfunction. She reports her hearing is down as would be expected but minimal pain and no drainage. The left ear required more work than anticipated as the t-tube had rotated and  the TM from the end of the umbo necessitated replacement of a good portion of the TM.     Physical examination:  female in no acute distress.  Alert and answering questions appropriately.  HB 1/6 bilaterally.  Both ears examined under the microscope. Left ear canal with some packing which was removed, TM intact. Right ear canal with some packing which was removed, ear canal with some edema along the incision, cartilage backing somewhat edematous, TM intact.    Assessment and plan:  Healing but with some edema on the right. We'll add pred forte to the floxin and she'll continue dry ear precautions. We'll see her back in a month with an audiogram. She understands that she'll need t-tube placement once the repairs as healed as she has underlying eustachian tube dysfunction.        Sommer Live MD

## 2021-03-04 NOTE — Clinical Note
3/4/2021       RE: Trupti Mcbride  [address] [facility] [address]     Dear Colleague,    Thank you for referring your patient, Trupti Mcbride, to the [facility] EAR NOSE AND THROAT CLINIC [place] at [facility]. Please see a copy of my visit note below.    No notes on file    Again, thank you for allowing me to participate in the care of your patient.      Sincerely,    Sommer Live MD

## 2021-03-04 NOTE — PATIENT INSTRUCTIONS
1. You were seen in the ENT Clinic today by Dr. Live.  If you have any questions or concerns after your appointment, please call   - Option 1: ENT Clinic: [phone]  - Option 2: [facility] (Dr. Live): [phone]    2.   Prescription for pred forte to  at the pharmacy here.    3.   Plan to return to clinic in one month with a hearing test.     Rosibel Epstein RN  Clinical Coordinator  [facility]- Otolaryngology  [phone]    Senait Pretty LPN  [facility]- Otolaryngology  [phone]

## 2021-03-29 NOTE — PROGRESS NOTES
Turpti Mcbride is seen for her first postoperative visit after bilateral cartilage tympanoplasties for eustachian tube dysfunction. She reports her hearing is down as would be expected but minimal pain and no drainage. The left ear required more work than anticipated as the t-tube had rotated and  the TM from the end of the umbo necessitated replacement of a good portion of the TM.     Physical examination:  female in no acute distress.  Alert and answering questions appropriately.  HB 1/6 bilaterally.  Both ears examined under the microscope. Left ear canal with some packing which was removed, TM intact. Right ear canal with some packing which was removed, ear canal with some edema along the incision, cartilage backing somewhat edematous, TM intact.    Assessment and plan:  Healing but with some edema on the right. We'll add pred forte to the floxin and she'll continue dry ear precautions. We'll see her back in a month with an audiogram. She understands that she'll need t-tube placement once the repairs as healed as she has underlying eustachian tube dysfunction.

## 2021-04-19 NOTE — PATIENT INSTRUCTIONS
1. You were seen in the ENT Clinic today by Dr. Live.  If you have any questions or concerns after your appointment, please call   - Option 1: ENT Clinic: [phone]   - Option 2: Senait (Dr. Live's Nurse): [phone]    2.   Plan to return to clinic in 6 months  Senait Pretty LPN  [facility] - Otolaryngology

## 2021-04-22 ENCOUNTER — OFFICE VISIT (OUTPATIENT)
Dept: AUDIOLOGY | Facility: CLINIC | Age: 61
End: 2021-04-22
Attending: OTOLARYNGOLOGY
Payer: COMMERCIAL

## 2021-04-22 ENCOUNTER — OFFICE VISIT (OUTPATIENT)
Dept: OTOLARYNGOLOGY | Facility: CLINIC | Age: 61
End: 2021-04-22
Payer: COMMERCIAL

## 2021-04-22 VITALS — HEIGHT: 67 IN | WEIGHT: 146 LBS | BODY MASS INDEX: 22.91 KG/M2

## 2021-04-22 DIAGNOSIS — H69.93 DYSFUNCTION OF BOTH EUSTACHIAN TUBES: Primary | ICD-10-CM

## 2021-04-22 DIAGNOSIS — H90.0 CONDUCTIVE HEARING LOSS, BILATERAL: ICD-10-CM

## 2021-04-22 DIAGNOSIS — H90.A12 CONDUCTIVE HEARING LOSS OF LEFT EAR WITH RESTRICTED HEARING OF RIGHT EAR: Primary | ICD-10-CM

## 2021-04-22 DIAGNOSIS — H69.93 DYSFUNCTION OF BOTH EUSTACHIAN TUBES: ICD-10-CM

## 2021-04-22 DIAGNOSIS — H90.A31 MIXED CONDUCTIVE AND SENSORINEURAL HEARING LOSS OF RIGHT EAR WITH RESTRICTED HEARING OF LEFT EAR: ICD-10-CM

## 2021-04-22 PROCEDURE — 92567 TYMPANOMETRY: CPT | Performed by: AUDIOLOGIST

## 2021-04-22 PROCEDURE — 99024 POSTOP FOLLOW-UP VISIT: CPT | Mod: GC | Performed by: OTOLARYNGOLOGY

## 2021-04-22 PROCEDURE — 92557 COMPREHENSIVE HEARING TEST: CPT | Performed by: AUDIOLOGIST

## 2021-04-22 ASSESSMENT — MIFFLIN-ST. JEOR: SCORE: 1269.88

## 2021-04-22 ASSESSMENT — PAIN SCALES - GENERAL: PAINLEVEL: NO PAIN (0)

## 2021-04-22 NOTE — PROGRESS NOTES
AUDIOLOGY REPORT    SUMMARY: Audiology visit completed. See audiogram for results.      RECOMMENDATIONS: Follow-up with ENT.    NISHA Magallanes.   Audiology Doctoral Extern  License #[id number]    I was present with the patient for the entire Audiology appointment including all procedures/testing performed by the AuD student, and agree with the student s assessment and plan as documented.    Joseph Santiago.  Licensed Audiologist  [place] #[id number]

## 2021-04-22 NOTE — LETTER
4/22/2021      RE: Trupti Mcbride  [address] [facility] [address]       Trupti Mcbride is seen for her second postoperative visit after bilateral cartilage tympanoplasties for eustachian tube dysfunction on 2/12/2021. She was last seen on 3/4/2021 at which time she was noted to be healing but with some edema on the right. Pred Forte drops were added to her Floxin drops at that time. Today she denies otologic complaints. She reports improvement in her hearing.    Physical examination:  female in no acute distress.  Alert and answering questions appropriately.  HB 1/6 bilaterally.  Both ears examined under the microscope. Right ear canal with minimal cerumen debrided with cerumen curette. Cartilage graft well-integrated and sitting at an appropriate height. T-tube in place and patent. Middle ear appears well-aerated. Left ear canal with moderate cerumen tracking to surface of cartilage graft, debrided with combination of straight pick, alligator forcep, and cerumen curette. Cartilage graft well-integrated and sitting at an adequate height. Anterior TM retracted. T-tube in place and patent. Middle ear appears well-aerated.    Audiogram: 4/22/2021     Right ear: Mild upsloping to normal downsloping to moderate-severe CHL.   Left ear: Normal downsloing to mild upsloping to normal downsloping to mild CHL.    SRT right: 30 dB left: 25 dB   WR right: 100% at 70 dB left: 100% at 65 dB   Acoustic Reflexes: DNT  Tympanograms: type B, large canal volume right, type B, large canal volume left   Audiogram from 10/20/2019 reviewed. Right AC thresholds 10-20 dB poorer at 250, 1000, & 8000 Hz, with 15 dB improvement at 4000 Hz. Left AC thresholds 10-20 dB poorer at 250-1000 & 4696-6827 Hz.    Assessment and plan:  Healing well bilaterally. Her most recent audiogram reveals poorer hearing thresholds at some frequencies and improved at others, though she reports subjective improvement in hearing. We would like to see  her back in 6 months with audio or sooner with problems.    I, Sommer Live MD, saw this patient with the resident/fellow and agree with the resident/fellow s findings and plan of care as documented in the resident s/fellow s note. I was present for the entire procedure.    MD Sommer Gan MD

## 2021-04-22 NOTE — NURSING NOTE
"Chief Complaint   Patient presents with     RECHECK     1 month follow up         Height 1.702 m (5' 7\"), weight 66.2 kg (146 lb), not currently breastfeeding.    Evie Guerra, EMT    "

## 2021-04-22 NOTE — PROGRESS NOTES
Trupti Mcbride is seen for her second postoperative visit after bilateral cartilage tympanoplasties for eustachian tube dysfunction on 2/12/2021. She was last seen on 3/4/2021 at which time she was noted to be healing but with some edema on the right. Pred Forte drops were added to her Floxin drops at that time. Today she denies otologic complaints. She reports improvement in her hearing.    Physical examination:  female in no acute distress.  Alert and answering questions appropriately.  HB 1/6 bilaterally.  Both ears examined under the microscope. Right ear canal with minimal cerumen debrided with cerumen curette. Cartilage graft well-integrated and sitting at an appropriate height. T-tube in place and patent. Middle ear appears well-aerated. Left ear canal with moderate cerumen tracking to surface of cartilage graft, debrided with combination of straight pick, alligator forcep, and cerumen curette. Cartilage graft well-integrated and sitting at an adequate height. Anterior TM retracted. T-tube in place and patent. Middle ear appears well-aerated.    Audiogram: 4/22/2021     Right ear: Mild upsloping to normal downsloping to moderate-severe CHL.   Left ear: Normal downsloing to mild upsloping to normal downsloping to mild CHL.    SRT right: 30 dB left: 25 dB   WR right: 100% at 70 dB left: 100% at 65 dB   Acoustic Reflexes: DNT  Tympanograms: type B, large canal volume right, type B, large canal volume left   Audiogram from 10/20/2019 reviewed. Right AC thresholds 10-20 dB poorer at 250, 1000, & 8000 Hz, with 15 dB improvement at 4000 Hz. Left AC thresholds 10-20 dB poorer at 250-1000 & 5337-0622 Hz.    Assessment and plan:  Healing well bilaterally. Her most recent audiogram reveals poorer hearing thresholds at some frequencies and improved at others, though she reports subjective improvement in hearing. We would like to see her back in 6 months with audio or sooner with problems.    Sommer CAMPBELL MD, saw  this patient with the resident/fellow and agree with the resident/fellow s findings and plan of care as documented in the resident s/fellow s note. I was present for the entire procedure.    Sommer Live MD

## 2021-04-25 ENCOUNTER — HEALTH MAINTENANCE LETTER (OUTPATIENT)
Age: 61
End: 2021-04-25

## 2021-10-10 ENCOUNTER — HEALTH MAINTENANCE LETTER (OUTPATIENT)
Age: 61
End: 2021-10-10

## 2021-10-28 ENCOUNTER — OFFICE VISIT (OUTPATIENT)
Dept: OTOLARYNGOLOGY | Facility: CLINIC | Age: 61
End: 2021-10-28
Payer: COMMERCIAL

## 2021-10-28 VITALS
HEART RATE: 98 BPM | HEIGHT: 67 IN | WEIGHT: 143 LBS | TEMPERATURE: 97.7 F | OXYGEN SATURATION: 97 % | BODY MASS INDEX: 22.44 KG/M2

## 2021-10-28 DIAGNOSIS — H69.93 DYSFUNCTION OF BOTH EUSTACHIAN TUBES: Primary | ICD-10-CM

## 2021-10-28 DIAGNOSIS — H90.0 CONDUCTIVE HEARING LOSS, BILATERAL: ICD-10-CM

## 2021-10-28 PROCEDURE — 92504 EAR MICROSCOPY EXAMINATION: CPT | Mod: GC | Performed by: OTOLARYNGOLOGY

## 2021-10-28 ASSESSMENT — MIFFLIN-ST. JEOR: SCORE: 1256.27

## 2021-10-28 ASSESSMENT — PAIN SCALES - GENERAL: PAINLEVEL: NO PAIN (0)

## 2021-10-28 NOTE — LETTER
10/28/2021      RE: Trupti Mcbride  [address] [facility] [address]         Otolaryngology Clinic      Name: Trupti Mcbride  MRN: [id number]  Age: 61 year old  : 1960  10/28/2021      Chief Complaint:   Follow up    History of Present Illness:   Trupti Mcbride is a 61 year old female status post bilateral cartilage tympanoplasties for eustachian tube dysfunction on 2021 who presents for follow up. She was last seen in clinic on 2021 and denied any complaints. Today,she reports no changes in symptoms since last clinic visit. Denies otorrhea, otalgia, changes in hearing, tinnitus or vertigo.     Physical examination:  Female in no acute distress.  Alert and answering questions appropriately.  HB 1/6 bilaterally.  Bilateral ears examined under the microscope.  - Right: ear canal with minimal cerumen cleaned. T-tube in place and patent. Dry crusts and ear drops around the tube partially cleaned. Cartilage graft in place posteriorly. Mild retraction of pars flaccida without skin tracking.   - Left: ear canal with minimal cerumen cleaned. T-tube in place and patent inferiorly. Cartilage graft well positioned. Mild retraction of anterior TM. Middle ear well aerated.      Assessment and Plan:  Bilateral ears healed well and no evidence of perforation or cholesteatoma on exam today. T-tube in place and patent bilaterally. We will see her back again in 6 months with audio or sooner as needed.     Aurora Clayton MD MPH   Fellow Physician  Otology & Neurotology  [facility]     Scribe Preparation Attestation:  ILili, xuan scribe, prepared the chart for today's encounter.      I, Sommer Live MD, saw this patient with the resident/fellow and agree with the resident/fellow s findings and plan of care as documented in the resident s/fellow s note. I was present for the entire procedure.    Sommer Live MD    The documentation recorded by the scribe accurately reflects the  services I personally performed and the decisions made by me.        Sommer Live MD

## 2021-10-28 NOTE — PROGRESS NOTES
Otolaryngology Clinic      Name: Trupti Mcbride  MRN: [id number]  Age: 61 year old  : 1960  10/28/2021      Chief Complaint:   Follow up    History of Present Illness:   Trupti Mcbride is a 61 year old female status post bilateral cartilage tympanoplasties for eustachian tube dysfunction on 2021 who presents for follow up. She was last seen in clinic on 2021 and denied any complaints. Today,she reports no changes in symptoms since last clinic visit. Denies otorrhea, otalgia, changes in hearing, tinnitus or vertigo.     Physical examination:  Female in no acute distress.  Alert and answering questions appropriately.  HB 1/6 bilaterally.  Bilateral ears examined under the microscope.  - Right: ear canal with minimal cerumen cleaned. T-tube in place and patent. Dry crusts and ear drops around the tube partially cleaned. Cartilage graft in place posteriorly. Mild retraction of pars flaccida without skin tracking.   - Left: ear canal with minimal cerumen cleaned. T-tube in place and patent inferiorly. Cartilage graft well positioned. Mild retraction of anterior TM. Middle ear well aerated.      Assessment and Plan:  Bilateral ears healed well and no evidence of perforation or cholesteatoma on exam today. T-tube in place and patent bilaterally. We will see her back again in 6 months with audio or sooner as needed.     Aurora Clayton MD MPH   Fellow Physician  Otology & Neurotology  [facility]     Scribe Preparation Attestation:  ILili, a scribe, prepared the chart for today's encounter.      I, Sommer Live MD, saw this patient with the resident/fellow and agree with the resident/fellow s findings and plan of care as documented in the resident s/fellow s note. I was present for the entire procedure.    Sommer Live MD    The documentation recorded by the scribe accurately reflects the services I personally performed and the decisions made by me.

## 2021-10-28 NOTE — NURSING NOTE
"Chief Complaint   Patient presents with     RECHECK     6 month follow up      Pulse 98, temperature 97.7  F (36.5  C), height 1.702 m (5' 7\"), weight 64.9 kg (143 lb), SpO2 97 %, not currently breastfeeding.    Edgardo Dominguez LPN    "

## 2021-10-28 NOTE — Clinical Note
10/28/2021       RE: Trupti Mcbride  [address] [facility] [address]     Dear Colleague,    Thank you for referring your patient, Trupti Mcbride, to the [facility] EAR NOSE AND THROAT CLINIC [place] at [facility]. Please see a copy of my visit note below.      Otolaryngology Clinic      Name: Trupti Mcbride  MRN: [id number]  Age: 61 year old  : 1960  10/28/2021      Chief Complaint:   Follow up    History of Present Illness:   Trupti Mcbride is a 61 year old female status post bilateral cartilage tympanoplasties for eustachian tube dysfunction on 2021 who presents for follow up. She was last seen in clinic on 2021 and denied any complaints. Today,she reports no changes in symptoms since last clinic visit. Denies otorrhea, otalgia, changes in hearing, tinnitus or vertigo.     Physical examination:  Female in no acute distress.  Alert and answering questions appropriately.  HB 1/6 bilaterally.  Bilateral ears examined under the microscope.  - Right: ear canal with minimal cerumen cleaned. T-tube in place and patent posteriorly. Dry crusts and ear drops around the tube partially cleaned. Cartilage graft in place posteriorly. Mild retraction of pars flaccida without skin tracking.   - Left: ear canal with minimal cerumen cleaned. T-tube in place and patent inferiorly. Cartilage graft well positioned. Mild retraction of anterior TM. Middle ear well aerated.      Assessment and Plan:  Bilateral ears healed well and no evidence of perforation or cholesteatoma on exam today. T-tube in place and patent bilaterally. We will see her back again in 6 months with audio or sooner as needed.     Follow-up: in 6 months    Aurora Clayton MD MPH   Fellow Physician  Otology & Neurotology  [facility]     Scribe Preparation Attestation:  I, Lili Torres, a scribe, prepared the chart for today's encounter.        Again, thank you for  allowing me to participate in the care of your patient.      Sincerely,    Sommer Live MD

## 2021-10-28 NOTE — PATIENT INSTRUCTIONS
1. You were seen in the ENT Clinic today by Dr. Live.  If you have any questions or concerns after your appointment, please call   - Option 1: ENT Clinic: [phone]   - Option 2: Senait (Dr. Live's Nurse): [phone]                   Kymberly(Dr. Live's Nurse): [phone]    2.   Plan to return to clinic in 6 month    Senait Pretty LPN  [facility] - Otolaryngology

## 2022-03-26 ENCOUNTER — HEALTH MAINTENANCE LETTER (OUTPATIENT)
Age: 62
End: 2022-03-26

## 2022-04-25 ENCOUNTER — TELEPHONE (OUTPATIENT)
Dept: OTOLARYNGOLOGY | Facility: CLINIC | Age: 62
End: 2022-04-25
Payer: COMMERCIAL

## 2022-05-05 ENCOUNTER — OFFICE VISIT (OUTPATIENT)
Dept: OTOLARYNGOLOGY | Facility: CLINIC | Age: 62
End: 2022-05-05
Payer: COMMERCIAL

## 2022-05-05 VITALS
BODY MASS INDEX: 21.97 KG/M2 | WEIGHT: 140 LBS | DIASTOLIC BLOOD PRESSURE: 67 MMHG | HEIGHT: 67 IN | OXYGEN SATURATION: 97 % | TEMPERATURE: 98.8 F | SYSTOLIC BLOOD PRESSURE: 105 MMHG | HEART RATE: 82 BPM

## 2022-05-05 DIAGNOSIS — H69.93 DYSFUNCTION OF BOTH EUSTACHIAN TUBES: Primary | ICD-10-CM

## 2022-05-05 PROCEDURE — 99212 OFFICE O/P EST SF 10 MIN: CPT | Mod: 25 | Performed by: OTOLARYNGOLOGY

## 2022-05-05 PROCEDURE — 92504 EAR MICROSCOPY EXAMINATION: CPT | Performed by: OTOLARYNGOLOGY

## 2022-05-05 ASSESSMENT — PAIN SCALES - GENERAL: PAINLEVEL: NO PAIN (0)

## 2022-05-05 NOTE — LETTER
"2022       RE: Trupti Mcbride  [address] [facility] [address]     Dear Colleague,    Thank you for referring your patient, Trupti Mcbride, to the [facility] EAR NOSE AND THROAT CLINIC [place] at [facility]. Please see a copy of my visit note below.      Otolaryngology Clinic      Name: Trupti Mcbride  MRN: [id number]  Age: 62 year old  : 2020      Chief Complaint:   Follow up    History of Present Illness:   Trutpi Mcbride is a 62 year old female with a history of bilateral cartilage tympanoplasties and bilateral t-tubes for eustachian tube dysfunction on (2021) who presents for follow up. She denied an concerns or complaints at her last 2 visits ( and 10/28/2021).     Today, the patient reports no issues with either ear--no otalgia, otorrhea or hearing concerns.      Physical Exam:   /67 (BP Location: Left arm, Patient Position: Sitting, Cuff Size: Adult Regular)   Pulse 82   Temp 98.8  F (37.1  C) (Temporal)   Ht 1.702 m (5' 7\")   Wt 63.5 kg (140 lb)   SpO2 97%   BMI 21.93 kg/m       Female in no acute distress.  Alert and answering questions appropriately.  HB 1/6 bilaterally.  Bilateral ears examined under the microscope.  - Left: t-tube is patent and in position, middle ear is well-aerated, cartilage graft in place posteriorly, no retractions, very minor amount of dried crusts  - Right: t-tube is patent and in position, stable mild anterior TM retraction, but middle ear is aerated, dried crusts around the tube that is stuck to the TM that I removed with a straight pick     Assessment and Plan:  Trupti Mcbride is a 62 year old female with a history of bilateral cartilage tympanoplasties and bilateral t-tubes for eustachian tube dysfunction on (2021) who presents for follow up. She is doing very well. We'll have her follow up again in 6 months for another t-tube check.       Scribe " Disclosure:  I, Melania Mares, am serving as a scribe to document services personally performed by Sommer Live MD at this visit, based upon the provider's statements to me. All documentation has been reviewed by the aforementioned provider prior to being entered into the official medical record.       Sommer Live MD       The documentation recorded by the scribe accurately reflects the services I personally performed and the decisions made by me.

## 2022-05-05 NOTE — PROGRESS NOTES
"  Otolaryngology Clinic      Name: Trupti Mcbride  MRN: [id number]  Age: 62 year old  : 2020      Chief Complaint:   Follow up    History of Present Illness:   Trupti Mcbride is a 62 year old female with a history of bilateral cartilage tympanoplasties and bilateral t-tubes for eustachian tube dysfunction on (2021) who presents for follow up. She denied an concerns or complaints at her last 2 visits ( and 10/28/2021).     Today, the patient reports no issues with either ear--no otalgia, otorrhea or hearing concerns.      Physical Exam:   /67 (BP Location: Left arm, Patient Position: Sitting, Cuff Size: Adult Regular)   Pulse 82   Temp 98.8  F (37.1  C) (Temporal)   Ht 1.702 m (5' 7\")   Wt 63.5 kg (140 lb)   SpO2 97%   BMI 21.93 kg/m       Female in no acute distress.  Alert and answering questions appropriately.  HB 1/6 bilaterally.  Bilateral ears examined under the microscope.  - Left: t-tube is patent and in position, middle ear is well-aerated, cartilage graft in place posteriorly, no retractions, very minor amount of dried crusts  - Right: t-tube is patent and in position, stable mild anterior TM retraction, but middle ear is aerated, dried crusts around the tube that is stuck to the TM that I removed with a straight pick     Assessment and Plan:  Trupti Mcbride is a 62 year old female with a history of bilateral cartilage tympanoplasties and bilateral t-tubes for eustachian tube dysfunction on (2021) who presents for follow up. She is doing very well. We'll have her follow up again in 6 months for another t-tube check.       Scribe Disclosure:  I, Melania Mares, am serving as a scribe to document services personally performed by Sommer Live MD at this visit, based upon the provider's statements to me. All documentation has been reviewed by the aforementioned provider prior to being entered into the official medical record.     Sommer Live MD     The " documentation recorded by the scribe accurately reflects the services I personally performed and the decisions made by me.

## 2022-05-05 NOTE — PATIENT INSTRUCTIONS
1. You were seen in the ENT Clinic today by Dr. Live.  If you have any questions or concerns after your appointment, please call   - Option 1: ENT Clinic: [phone]   - Option 2: Senait (Dr. Live's Nurse): [phone]                   Kymberly(Dr. Live's Nurse): [phone]    2.   Plan to return to clinic in 6 months    Senait Pretty LPN  [facility] - Otolaryngology

## 2022-05-05 NOTE — NURSING NOTE
"Chief Complaint   Patient presents with     RECHECK     Retun in 6 months for follow up without WIN    Blood pressure 105/67, pulse 82, temperature 98.8  F (37.1  C), temperature source Temporal, height 1.702 m (5' 7\"), weight 63.5 kg (140 lb), SpO2 97 %, not currently breastfeeding. Evie Boone, EMT  "

## 2022-05-21 ENCOUNTER — HEALTH MAINTENANCE LETTER (OUTPATIENT)
Age: 62
End: 2022-05-21

## 2022-09-18 ENCOUNTER — HEALTH MAINTENANCE LETTER (OUTPATIENT)
Age: 62
End: 2022-09-18

## 2023-02-17 ENCOUNTER — OFFICE VISIT (OUTPATIENT)
Dept: OTOLARYNGOLOGY | Facility: CLINIC | Age: 63
End: 2023-02-17
Payer: COMMERCIAL

## 2023-02-17 VITALS
SYSTOLIC BLOOD PRESSURE: 133 MMHG | BODY MASS INDEX: 22.76 KG/M2 | TEMPERATURE: 98.9 F | DIASTOLIC BLOOD PRESSURE: 88 MMHG | HEIGHT: 67 IN | HEART RATE: 85 BPM | OXYGEN SATURATION: 95 % | WEIGHT: 145 LBS

## 2023-02-17 DIAGNOSIS — Z45.89 TYMPANOSTOMY TUBE CHECK: Primary | ICD-10-CM

## 2023-02-17 PROCEDURE — 92504 EAR MICROSCOPY EXAMINATION: CPT | Performed by: PHYSICIAN ASSISTANT

## 2023-02-17 PROCEDURE — 99213 OFFICE O/P EST LOW 20 MIN: CPT | Mod: 25 | Performed by: PHYSICIAN ASSISTANT

## 2023-02-17 RX ORDER — CIPROFLOXACIN AND DEXAMETHASONE 3; 1 MG/ML; MG/ML
4 SUSPENSION/ DROPS AURICULAR (OTIC) 2 TIMES DAILY
Qty: 7.5 ML | Refills: 0 | Status: SHIPPED | OUTPATIENT
Start: 2023-02-17

## 2023-02-17 ASSESSMENT — PAIN SCALES - GENERAL: PAINLEVEL: NO PAIN (0)

## 2023-02-17 NOTE — NURSING NOTE
"Chief Complaint   Patient presents with     RECHECK     6 month follow up      Blood pressure 133/88, pulse 85, temperature 98.9  F (37.2  C), height 1.702 m (5' 7\"), weight 65.8 kg (145 lb), SpO2 95 %, not currently breastfeeding.    Edgardo Dominguez LPN    "

## 2023-02-17 NOTE — LETTER
"2/17/2023       RE: Trupti Mcbride  [address] [facility] [address]     Dear Colleague,    Thank you for referring your patient, Trupti Mcbride, to the [facility] EAR NOSE AND [facility]AT [facility]IN[facility] [place] at [facility]. Please see a copy of my visit note below.      Otolaryngology Clinic  February 17, 2023    Chief Complaint:   Follow up       Trupti Mcbride is a 62 year old female with a history of bilateral cartilage tympanoplasties and bilateral t-tubes for eustachian tube dysfunction on (02/12/2021) who presents for follow up. She was last seen by Dr. Live on 5/5/2022 at which time she had no complaints.    Patient denies any otalgia, otorrhea, hearing loss.       Physical Exam:    /88   Pulse 85   Temp 98.9  F (37.2  C)   Ht 1.702 m (5' 7\")   Wt 65.8 kg (145 lb)   SpO2 95%   BMI 22.71 kg/m       Constitutional:  The patient was unaccompanied, well-groomed, and in no acute distress.     Skin: Normal:  warm and pink without rash   Neurologic: Alert and oriented x 3.  Voice normal.   Psychiatric: The patient's affect was calm, cooperative, and appropriate.    Communication:  Normal; communicates verbally, normal voice quality.   Respiratory: Breathing comfortably without stridor or exertion of accessory muscles.   Head/Face:  Normocephalic and atraumatic.  No lesions or scars. No erythema or edema.   Ears: Pinnae and tragus non-tender.  See microscope exam.   Nose: No drainage or external deformity      Otologic microscope exam:    Biocular Microscopy exam is needed due to presence of tympanostomy tubes in bilateral ears, requiring direct visualization for use of cleaning instruments.    Right ear was examined under the microscope.  Cerumen present. It was cleaned with right angle hook. Once cleaned, TM visualized under microscope. T-tube is patent and in position, stable mild anterior TM retraction, but middle ear is aerated, dried " crusts around the tube that is stuck to the TM. Crusts attempted to be removed however patient did not tolerate.    Left ear was also examined under the microscope. T-tube is patent and in position, middle ear is well-aerated, cartilage graft in place posteriorly, no retractions, significant dark crust around tube on TM. Patient did not tolerate ear cleaning so crusting remained.         Assessment and Plan:  1. Tympanostomy tube check  Patient reports no current complaints regarding her T-tubes. TMs appear healthy. Recommended mineral oil or drops to ears bilaterally prior to next cleaning to assist with crust removal. Patient opted for ciprodex drops. Rx sent. Follow up in 4 months for another T-tube check.      - ciprofloxacin-dexamethasone (CIPRODEX) 0.3-0.1 % otic suspension; Place 4 drops into both ears 2 times daily Use drops for 3 days prior to next ear cleaning  Dispense: 7.5 mL; Refill: 0        Patient will follow up in 4 months    Rosa Gasca PA-C  Otolaryngology  Head & Neck Surgery  [phone]    Review of external notes as documented elsewhere in note  20 minutes spent on the date of the encounter doing chart review, history and exam, documentation and further activities per the note

## 2023-02-17 NOTE — PROGRESS NOTES
"  Otolaryngology Clinic  February 17, 2023    Chief Complaint:   Follow up       Trupti Mcbride is a 62 year old female with a history of bilateral cartilage tympanoplasties and bilateral t-tubes for eustachian tube dysfunction on (02/12/2021) who presents for follow up. She was last seen by Dr. Live on 5/5/2022 at which time she had no complaints.    Patient denies any otalgia, otorrhea, hearing loss.       Physical Exam:    /88   Pulse 85   Temp 98.9  F (37.2  C)   Ht 1.702 m (5' 7\")   Wt 65.8 kg (145 lb)   SpO2 95%   BMI 22.71 kg/m       Constitutional:  The patient was unaccompanied, well-groomed, and in no acute distress.     Skin: Normal:  warm and pink without rash   Neurologic: Alert and oriented x 3.  Voice normal.   Psychiatric: The patient's affect was calm, cooperative, and appropriate.    Communication:  Normal; communicates verbally, normal voice quality.   Respiratory: Breathing comfortably without stridor or exertion of accessory muscles.   Head/Face:  Normocephalic and atraumatic.  No lesions or scars. No erythema or edema.   Ears: Pinnae and tragus non-tender.  See microscope exam.   Nose: No drainage or external deformity      Otologic microscope exam:    Biocular Microscopy exam is needed due to presence of tympanostomy tubes in bilateral ears, requiring direct visualization for use of cleaning instruments.    Right ear was examined under the microscope.  Cerumen present. It was cleaned with right angle hook. Once cleaned, TM visualized under microscope. T-tube is patent and in position, stable mild anterior TM retraction, but middle ear is aerated, dried crusts around the tube that is stuck to the TM. Crusts attempted to be removed however patient did not tolerate.    Left ear was also examined under the microscope. T-tube is patent and in position, middle ear is well-aerated, cartilage graft in place posteriorly, no retractions, significant dark crust around tube on TM. Patient did " not tolerate ear cleaning so crusting remained.         Assessment and Plan:  1. Tympanostomy tube check  Patient reports no current complaints regarding her T-tubes. TMs appear healthy. Recommended mineral oil or drops to ears bilaterally prior to next cleaning to assist with crust removal. Patient opted for ciprodex drops. Rx sent. Follow up in 4 months for another T-tube check.      - ciprofloxacin-dexamethasone (CIPRODEX) 0.3-0.1 % otic suspension; Place 4 drops into both ears 2 times daily Use drops for 3 days prior to next ear cleaning  Dispense: 7.5 mL; Refill: 0        Patient will follow up in 4 months    Rosa Gasca PA-C  Otolaryngology  Head & Neck Surgery  [phone]    Review of external notes as documented elsewhere in note  20 minutes spent on the date of the encounter doing chart review, history and exam, documentation and further activities per the note

## 2023-06-04 ENCOUNTER — HEALTH MAINTENANCE LETTER (OUTPATIENT)
Age: 63
End: 2023-06-04

## 2023-06-07 ENCOUNTER — OFFICE VISIT (OUTPATIENT)
Dept: OTOLARYNGOLOGY | Facility: CLINIC | Age: 63
End: 2023-06-07
Payer: COMMERCIAL

## 2023-06-07 DIAGNOSIS — H69.93 DYSFUNCTION OF BOTH EUSTACHIAN TUBES: Primary | ICD-10-CM

## 2023-06-07 DIAGNOSIS — H61.23 BILATERAL IMPACTED CERUMEN: ICD-10-CM

## 2023-06-07 DIAGNOSIS — H90.0 CONDUCTIVE HEARING LOSS, BILATERAL: ICD-10-CM

## 2023-06-07 PROCEDURE — 99213 OFFICE O/P EST LOW 20 MIN: CPT | Mod: 25 | Performed by: OTOLARYNGOLOGY

## 2023-06-07 PROCEDURE — 69210 REMOVE IMPACTED EAR WAX UNI: CPT | Mod: 50 | Performed by: OTOLARYNGOLOGY

## 2023-06-07 RX ORDER — OFLOXACIN 3 MG/ML
4 SOLUTION AURICULAR (OTIC) 2 TIMES DAILY
Qty: 5 ML | Refills: 0 | Status: SHIPPED | OUTPATIENT
Start: 2023-06-07 | End: 2023-06-14

## 2023-06-07 RX ORDER — PREDNISOLONE ACETATE 10 MG/ML
4 SUSPENSION/ DROPS OPHTHALMIC 2 TIMES DAILY
Qty: 5 ML | Refills: 0 | Status: SHIPPED | OUTPATIENT
Start: 2023-06-07 | End: 2023-06-14

## 2023-06-07 NOTE — NURSING NOTE
Chief Complaint   Patient presents with     RECHECK     4 month follow up     Edgardo Dominguez LPN

## 2023-06-07 NOTE — LETTER
6/7/2023      RE: Trupti Mcbride  [address] [facility] [address]       Trupti Mcbride is seen for bilateral t-tube checks. She has not been seen since May 2022. She reports no issues with either ear--no otalgia, otorrhea or hearing changes. She did use drops in both ears prior to the appointment as she had firm crusting around the t-tube last time.    Physical examination:  female in no acute distress.  Alert and answering questions appropriately.  HB 1/6 bilaterally.  Both ears examined under the microscope. Right t-tube with a large soft crust completely surrounding the shaft and base and covering a portion of the TM, the entire crust was able to be removed with a straight pick, alligator and suction, there is cholesteatoma on the surface of the TM underneath the crust with a perforation around the base of the t-tube of approximately 25% of the posterior inferior TM with the edge of the cartilage graft visible, all the epithelium was suctioned off, the middle ear remains healthy with no retractions. Left t-tube with a large soft crust covering the base and surrounding the shaft, the t-tube is inferiorly positioned and resting on the inferior ear canal, the crust was able to be removed with a straight pick, alligator and suction, there is also cholesteatoma on the surface of the TM and a perforation around the base but only slightly larger than the base of the t-tube, there is granulation tissue on the anterior edge of the myringotomy, middle ear remains aerated and healthy in appearance. Please note the microscope was necessary to examine both ears due to the bilateral eustachian tube dysfunction and t-tubes.    Assessment and plan:  Bilateral t-tubes in position with cholesteatoma on the TM underlying the crusts and perforations around the base. The ears were cleaned of epithelium today. I'll see her back in 6 months and she will use Floxin before that visit. I will also have her use floxin plus  pred forte to the left ear for the next week due to the granulation tissue.        Sommer Live MD

## 2023-06-07 NOTE — PATIENT INSTRUCTIONS
You were seen in the ENT Clinic today by Dr. Live. If you have any questions or concerns after your appointment, please contact us (see below)      2.   Please return to clinic in 6 weeks.  3.   Medication has been sent to your preferred pharmacy, please use as directed.         How to Contact Us:  Send a [email or web address] message to your provider. Our team will respond to you via [email or web address]. Occasionally, we will need to call you to get further information.  For urgent matters (Monday-Friday), call the ENT Clinic: [phone] and speak with a call center team member - they will route your call appropriately.   If you'd like to speak directly with a nurse, please find our contact information below. We do our best to check voicemail frequently throughout the day, and will work to call you back within 1-2 days. For urgent matters, please use the general clinic phone numbers listed above.      Jasmin TALLEY RN  ENT RN Care Coordinator  Direct: [phone]    Senait THOMAS LPN  Direct: [phone]

## 2023-06-08 NOTE — PROGRESS NOTES
Trupti Mcbride is seen for bilateral t-tube checks. She has not been seen since May 2022. She reports no issues with either ear--no otalgia, otorrhea or hearing changes. She did use drops in both ears prior to the appointment as she had firm crusting around the t-tube last time.    Physical examination:  female in no acute distress.  Alert and answering questions appropriately.  HB 1/6 bilaterally.  Both ears examined under the microscope. Right t-tube with a large soft crust completely surrounding the shaft and base and covering a portion of the TM, the entire crust was able to be removed with a straight pick, alligator and suction, there is cholesteatoma on the surface of the TM underneath the crust with a perforation around the base of the t-tube of approximately 25% of the posterior inferior TM with the edge of the cartilage graft visible, all the epithelium was suctioned off, the middle ear remains healthy with no retractions. Left t-tube with a large soft crust covering the base and surrounding the shaft, the t-tube is inferiorly positioned and resting on the inferior ear canal, the crust was able to be removed with a straight pick, alligator and suction, there is also cholesteatoma on the surface of the TM and a perforation around the base but only slightly larger than the base of the t-tube, there is granulation tissue on the anterior edge of the myringotomy, middle ear remains aerated and healthy in appearance. Please note the microscope was necessary to examine both ears due to the bilateral eustachian tube dysfunction and t-tubes.    Assessment and plan:  Bilateral t-tubes in position with cholesteatoma on the TM underlying the crusts and perforations around the base. The ears were cleaned of epithelium today. I'll see her back in 6 months and she will use Floxin before that visit. I will also have her use floxin plus pred forte to the left ear for the next week due to the granulation tissue.

## 2023-07-24 ENCOUNTER — OFFICE VISIT (OUTPATIENT)
Dept: OTOLARYNGOLOGY | Facility: CLINIC | Age: 63
End: 2023-07-24
Payer: COMMERCIAL

## 2023-07-24 VITALS
TEMPERATURE: 96.8 F | OXYGEN SATURATION: 96 % | WEIGHT: 146 LBS | SYSTOLIC BLOOD PRESSURE: 130 MMHG | HEART RATE: 74 BPM | BODY MASS INDEX: 22.91 KG/M2 | HEIGHT: 67 IN | DIASTOLIC BLOOD PRESSURE: 78 MMHG

## 2023-07-24 DIAGNOSIS — H69.93 DYSFUNCTION OF BOTH EUSTACHIAN TUBES: Primary | ICD-10-CM

## 2023-07-24 PROCEDURE — 92504 EAR MICROSCOPY EXAMINATION: CPT | Mod: GC | Performed by: OTOLARYNGOLOGY

## 2023-07-24 PROCEDURE — 99212 OFFICE O/P EST SF 10 MIN: CPT | Mod: 25 | Performed by: OTOLARYNGOLOGY

## 2023-07-24 RX ORDER — OFLOXACIN 3 MG/ML
5 SOLUTION AURICULAR (OTIC) 2 TIMES DAILY
Qty: 5 ML | Refills: 0 | Status: SHIPPED | OUTPATIENT
Start: 2023-07-24 | End: 2024-01-31

## 2023-07-24 ASSESSMENT — PAIN SCALES - GENERAL: PAINLEVEL: NO PAIN (0)

## 2023-07-24 NOTE — LETTER
7/24/2023      RE: Trupti Mcbride  [address] [facility] [address]       Trupti Mcbride is seen for bilateral t-tube checks. She was last seen on 6/7/2023 and was noted to have cholesteatoma on bilateral TMs underneath crusting with bilateral perforations at the base of her t-tubes. She was prescribed floxin which he used immediately after that visit as well as prior to this visit.   She has been doing well overall and denies any drainage, otalgia, changes in hearing.    Physical examination:  female in no acute distress.  Alert and answering questions appropriately.  HB 1/6 bilaterally.  Both ears examined under the microscope.   Right t-tube with a small crust at the base which was adherent to the TM. It was gently removed with a straight pick and alligator. There is a small perforation around the T tube insertion without any trapped epithelium. T-tube is positioned correctly and is patent. The perforation around the base is only slightly larger than the base of the t-tube. The middle ear remains aerated and healthy in appearance.  Left t-tube with small amount of crust covering the base which was removed easily. A small granuloma was noted anteriorly.  T-tube is positioned correctly and is patent. The perforation around the base is only slightly larger than the base of the t-tube. The middle ear remains aerated and healthy in appearance. Please note the microscope was necessary to examine both ears due to the bilateral eustachian tube dysfunction and t-tubes.    Assessment and plan:  Bilateral t-tubes in position with minimal crusting on the TM and no evidence of recurrent cholesteatoma but there are small perforations around the base. The ears were cleaned today. I'll see her back in 6 months and she will use Floxin for 5 days before that visit.     Angelica Schafer MD  Fellow Physician  Otology & Neurotology  [facility]     I, Sommer Live MD, saw this patient with the  resident/fellow and agree with the resident/fellow s findings and plan of care as documented in the resident s/fellow s note. I was present for the entire procedure.    Sommer Live MD

## 2023-07-24 NOTE — NURSING NOTE
"Chief Complaint   Patient presents with    RECHECK     6 week follow up      ..  Blood pressure 130/78, pulse 74, temperature 96.8  F (36  C), height 1.702 m (5' 7\"), weight 66.2 kg (146 lb), SpO2 96 %, not currently breastfeeding.  Edgardo Dominguez LPN    "

## 2023-07-24 NOTE — PATIENT INSTRUCTIONS
You were seen in the ENT Clinic today by Dr. Live. If you have any questions or concerns after your appointment, please contact us (see below)      2.   Please return to clinic in 6 months.        How to Contact Us:  Send a [employer] message to your provider. Our team will respond to you via [employer]. Occasionally, we will need to call you to get further information.  For urgent matters (Monday-Friday), call the ENT Clinic: [phone] and speak with a call center team member - they will route your call appropriately.   If you'd like to speak directly with a nurse, please find our contact information below. We do our best to check voicemail frequently throughout the day, and will work to call you back within 1-2 days. For urgent matters, please use the general clinic phone numbers listed above.      Jasmin TALLEY RN  ENT RN Care Coordinator  Direct: [phone]    Senait THOMAS LPN  Direct: [phone]

## 2023-07-24 NOTE — PROGRESS NOTES
Trupti Mcbride is seen for bilateral t-tube checks. She was last seen on 6/7/2023 and was noted to have cholesteatoma on bilateral TMs underneath crusting with bilateral perforations at the base of her t-tubes. She was prescribed floxin which he used immediately after that visit as well as prior to this visit.   She has been doing well overall and denies any drainage, otalgia, changes in hearing.    Physical examination:  female in no acute distress.  Alert and answering questions appropriately.  HB 1/6 bilaterally.  Both ears examined under the microscope.   Right t-tube with a small crust at the base which was adherent to the TM. It was gently removed with a straight pick and alligator. There is a small perforation around the T tube insertion without any trapped epithelium. T-tube is positioned correctly and is patent. The perforation around the base is only slightly larger than the base of the t-tube. The middle ear remains aerated and healthy in appearance.  Left t-tube with small amount of crust covering the base which was removed easily. A small granuloma was noted anteriorly.  T-tube is positioned correctly and is patent. The perforation around the base is only slightly larger than the base of the t-tube. The middle ear remains aerated and healthy in appearance. Please note the microscope was necessary to examine both ears due to the bilateral eustachian tube dysfunction and t-tubes.    Assessment and plan:  Bilateral t-tubes in position with minimal crusting on the TM and no evidence of recurrent cholesteatoma but there are small perforations around the base. The ears were cleaned today. I'll see her back in 6 months and she will use Floxin for 5 days before that visit.     Angelica Schafer MD  Fellow Physician  Otology & Neurotology  [facility]     ISommer MD, saw this patient with the resident/fellow and agree with the resident/fellow s findings and plan of care as documented in  the resident s/fellow s note. I was present for the entire procedure.    Sommer Live MD

## 2023-08-10 ENCOUNTER — TELEPHONE (OUTPATIENT)
Dept: OTOLARYNGOLOGY | Facility: CLINIC | Age: 63
End: 2023-08-10
Payer: COMMERCIAL

## 2024-01-31 ENCOUNTER — OFFICE VISIT (OUTPATIENT)
Dept: OTOLARYNGOLOGY | Facility: CLINIC | Age: 64
End: 2024-01-31
Payer: COMMERCIAL

## 2024-01-31 VITALS
HEART RATE: 82 BPM | HEIGHT: 67 IN | WEIGHT: 141 LBS | SYSTOLIC BLOOD PRESSURE: 120 MMHG | DIASTOLIC BLOOD PRESSURE: 84 MMHG | TEMPERATURE: 96.7 F | BODY MASS INDEX: 22.13 KG/M2 | OXYGEN SATURATION: 96 %

## 2024-01-31 DIAGNOSIS — H69.93 DYSFUNCTION OF BOTH EUSTACHIAN TUBES: ICD-10-CM

## 2024-01-31 PROCEDURE — 99212 OFFICE O/P EST SF 10 MIN: CPT | Mod: 25 | Performed by: OTOLARYNGOLOGY

## 2024-01-31 PROCEDURE — 92504 EAR MICROSCOPY EXAMINATION: CPT | Performed by: OTOLARYNGOLOGY

## 2024-01-31 RX ORDER — OFLOXACIN 3 MG/ML
5 SOLUTION AURICULAR (OTIC) 2 TIMES DAILY
Qty: 5 ML | Refills: 0 | Status: SHIPPED | OUTPATIENT
Start: 2024-01-31

## 2024-01-31 ASSESSMENT — PAIN SCALES - GENERAL: PAINLEVEL: NO PAIN (0)

## 2024-01-31 NOTE — LETTER
1/31/2024      RE: Trupti Mcbride  [address] [facility] [address]       Trupti Mcbride is seen for bilateral t-tube checks. Today, she reports she has been using her eardrops to help with collection of cerumen in the ear canal. She has had no acute issues with her bilateral t-tubes. Her hearing has remained unchanged. She denies otalgia or otorrhea.    Physical examination:  female in no acute distress.  Alert and answering questions appropriately.  HB 1/6 bilaterally.  Both ears examined under the microscope. Left: t-tube extruded, sitting in lateral ear canal surrounded by cerumen, this was removed and the cerumen cleaned with curette and alligator, small perforation in posterior anterior quadrant on inferior edge of cartilage graft, with thick edges along the cartilage graft, middle ear aerated. Right: t-tube intact in anterior quadrant with crusting around base which was removed with a straight pick and suction, otherwise middle ear aerated.    Assessment and plan:     Trupti Mcbride is seen for bilateral t-tube checks. The left t-tube extruded and was removed with a residual perforation. Right t-tube was intact. Instruct patient to input eardrops 5 days before her next appointment to help soften up the crusting around her t-tube. Return to care in 3 months     Scribe Disclosure:   I, Suma Emmanuel, am serving as a scribe; to document services personally performed by Sommer Live MD -based on data collection and the provider's statements to me.     Provider Disclosure:  I agree with above History, Review of Systems, Physical exam and Plan.  I have reviewed the content of the documentation and have edited it as needed. I have personally performed the services documented here and the documentation accurately represents those services and the decisions I have made.      Sommer Live MD

## 2024-01-31 NOTE — NURSING NOTE
"Chief Complaint   Patient presents with    RECHECK     6 month follow up      Blood pressure 120/84, pulse 82, temperature (!) 96.7  F (35.9  C), height 1.702 m (5' 7\"), weight 64 kg (141 lb), SpO2 96%, not currently breastfeeding.  Edgardo Dominguez LPN    "

## 2024-01-31 NOTE — PROGRESS NOTES
Trupti Mcbride is seen for bilateral t-tube checks. Today, she reports she has been using her eardrops to help with collection of cerumen in the ear canal. She has had no acute issues with her bilateral t-tubes. Her hearing has remained unchanged. She denies otalgia or otorrhea.    Physical examination:  female in no acute distress.  Alert and answering questions appropriately.  HB 1/6 bilaterally.  Both ears examined under the microscope. Left: t-tube extruded, sitting in lateral ear canal surrounded by cerumen, this was removed and the cerumen cleaned with curette and alligator, small perforation in posterior anterior quadrant on inferior edge of cartilage graft, with thick edges along the cartilage graft, middle ear aerated. Right: t-tube intact in anterior quadrant with crusting around base which was removed with a straight pick and suction, otherwise middle ear aerated.    Assessment and plan:     Trupti Mcbride is seen for bilateral t-tube checks. The left t-tube extruded and was removed with a residual perforation. Right t-tube was intact. Instruct patient to input eardrops 5 days before her next appointment to help soften up the crusting around her t-tube. Return to care in 3 months     Scribe Disclosure:   I, Suma Emmanuel, am serving as a scribe; to document services personally performed by Sommer Live MD -based on data collection and the provider's statements to me.     Provider Disclosure:  I agree with above History, Review of Systems, Physical exam and Plan.  I have reviewed the content of the documentation and have edited it as needed. I have personally performed the services documented here and the documentation accurately represents those services and the decisions I have made.

## 2024-05-05 ENCOUNTER — HEALTH MAINTENANCE LETTER (OUTPATIENT)
Age: 64
End: 2024-05-05

## 2024-05-14 NOTE — PROGRESS NOTES
"Trupti Mcbride is a 64 year old female being seen for a three-month follow-up for bilateral t-tube checks. Her previous visit was on 01/31/2024, where her left t-tube had extruded and was removed with a residual perforation and the right t-tube was still intact. She says that she doesn't feel a difference since the tube removal, but has been using the drops. She wonders if the left hole is open as it seemed a bit plugged with drop usage. She thinks that the right tube might be \"just sitting there\" although she didn't notice any changes with drops.    /79   Pulse 78   Temp 98.1  F (36.7  C)   Ht 1.702 m (5' 7\")   Wt 64.4 kg (142 lb)   SpO2 95%   BMI 22.24 kg/m    Physical examination:  female in no acute distress.  Alert and answering questions appropriately.  HB 1/6 bilaterally.  Both ears examined under the microscope.   On the left side, there is a little bit of dried crust on the TM, which was removed with a straight pick and alligator. She has an approximately 20% posterior inferior perforation with slightly thickened edges. The perforation was trying to close and she did have some epithelium on the anterior edge of the perforation. This was suctioned away, as the epithelium was trying to grow on the under-surface of the Tm. Middle ear was otherwise aerated.   The right side t-tube was in position in the anterior quadrant with a slight amount of moist crusting, which was removed with a straight pick and suction. She had been using the drops prior to the visit.     Assessment and plan:  Trupti Mcbride is a 64 year old female being seen for a three-month follow-up for bilateral t-tube checks. Upon examination, the perforation on the left side is open and the tube on the right is also open. She will return in 6-months.     Scribe Disclosure:   ADRIAN, April Leon, am serving as a scribe; to document services personally performed by Sommer Live MD -based on data collection and the provider's " statements to me.     Provider Disclosure:  I agree with above History, Review of Systems, Physical exam and Plan.  I have reviewed the content of the documentation and have edited it as needed. I have personally performed the services documented here and the documentation accurately represents those services and the decisions I have made.

## 2024-05-15 ENCOUNTER — OFFICE VISIT (OUTPATIENT)
Dept: OTOLARYNGOLOGY | Facility: CLINIC | Age: 64
End: 2024-05-15
Payer: COMMERCIAL

## 2024-05-15 VITALS
HEIGHT: 67 IN | SYSTOLIC BLOOD PRESSURE: 124 MMHG | OXYGEN SATURATION: 95 % | HEART RATE: 78 BPM | DIASTOLIC BLOOD PRESSURE: 79 MMHG | TEMPERATURE: 98.1 F | BODY MASS INDEX: 22.29 KG/M2 | WEIGHT: 142 LBS

## 2024-05-15 DIAGNOSIS — H72.92 PERFORATION OF LEFT TYMPANIC MEMBRANE: ICD-10-CM

## 2024-05-15 DIAGNOSIS — H69.93 DYSFUNCTION OF BOTH EUSTACHIAN TUBES: Primary | ICD-10-CM

## 2024-05-15 PROCEDURE — 92504 EAR MICROSCOPY EXAMINATION: CPT | Performed by: OTOLARYNGOLOGY

## 2024-05-15 PROCEDURE — 99212 OFFICE O/P EST SF 10 MIN: CPT | Mod: 25 | Performed by: OTOLARYNGOLOGY

## 2024-05-15 ASSESSMENT — PAIN SCALES - GENERAL: PAINLEVEL: NO PAIN (0)

## 2024-05-15 NOTE — PATIENT INSTRUCTIONS
You were seen in the ENT Clinic today by Dr. Lvie. If you have any questions or concerns after your appointment, please contact us (see below)    Please return to clinic in 6 months    How to Contact Us:  Send a [employer] message to your provider. Our team will respond to you via [employer]. Occasionally, we will need to call you to get further information.  For urgent matters (Monday-Friday), call the ENT Clinic: [phone] and speak with a call center team member - they will route your call appropriately.   If you'd like to speak directly with a nurse, please find our contact information below. We do our best to check voicemail frequently throughout the day, and will work to call you back within 1-2 days. For urgent matters, please use the general clinic phone numbers listed above.    Jasmin TALLEY, RN, BSN  RN Care Coordinator, ENT Clinic  [facility]  Direct: [phone]  Senait GONZALEZ LPN  Direct: [phone]

## 2024-05-15 NOTE — NURSING NOTE
"Chief Complaint   Patient presents with    RECHECK     Follow up     Blood pressure 124/79, pulse 78, temperature 98.1  F (36.7  C), height 1.702 m (5' 7\"), weight 64.4 kg (142 lb), SpO2 95%, not currently breastfeeding.  Edgardo Dominguez LPN    "

## 2024-05-15 NOTE — LETTER
"5/15/2024      RE: Trupti Mcbride  [address] [facility] [address]       Trupti Mcbride is a 64 year old female being seen for a three-month follow-up for bilateral t-tube checks. Her previous visit was on 01/31/2024, where her left t-tube had extruded and was removed with a residual perforation and the right t-tube was still intact. She says that she doesn't feel a difference since the tube removal, but has been using the drops. She wonders if the left hole is open as it seemed a bit plugged with drop usage. She thinks that the right tube might be \"just sitting there\" although she didn't notice any changes with drops.    /79   Pulse 78   Temp 98.1  F (36.7  C)   Ht 1.702 m (5' 7\")   Wt 64.4 kg (142 lb)   SpO2 95%   BMI 22.24 kg/m    Physical examination:  female in no acute distress.  Alert and answering questions appropriately.  HB 1/6 bilaterally.  Both ears examined under the microscope.   On the left side, there is a little bit of dried crust on the TM, which was removed with a straight pick and alligator. She has an approximately 20% posterior inferior perforation with slightly thickened edges. The perforation was trying to close and she did have some epithelium on the anterior edge of the perforation. This was suctioned away, as the epithelium was trying to grow on the under-surface of the Tm. Middle ear was otherwise aerated.   The right side t-tube was in position in the anterior quadrant with a slight amount of moist crusting, which was removed with a straight pick and suction. She had been using the drops prior to the visit.     Assessment and plan:  Trupti Mcbride is a 64 year old female being seen for a three-month follow-up for bilateral t-tube checks. Upon examination, the perforation on the left side is open and the tube on the right is also open. She will return in 6-months.     Scribe Disclosure:   I, April Leon, am serving as a scribe; to document services " personally performed by Sommer Live MD -based on data collection and the provider's statements to me.     Provider Disclosure:  I agree with above History, Review of Systems, Physical exam and Plan.  I have reviewed the content of the documentation and have edited it as needed. I have personally performed the services documented here and the documentation accurately represents those services and the decisions I have made.        Sommer Live MD

## 2024-07-14 ENCOUNTER — HEALTH MAINTENANCE LETTER (OUTPATIENT)
Age: 64
End: 2024-07-14

## 2024-10-30 DIAGNOSIS — H69.93 DYSFUNCTION OF BOTH EUSTACHIAN TUBES: ICD-10-CM

## 2024-11-04 NOTE — TELEPHONE ENCOUNTER
Medication Requested:   Disp Refills Start End WADE   ofloxacin (FLOXIN) 0.3 % otic solution 5 mL 0 1/31/2024 -- No   Sig - Route: Place 5 drops into both ears 2 times daily 5 days leading up to appt - Both Ears     ----------------------  Last Office Visit : 5/15/2024  [facility]      Future Office visit:     11/14/2024 7:30 AM (15 min)  [facility]   Arrive by:  7:15 AM   RETURN EAR   [facility] ([facility])   Sommer Live MD     ----------------------        Refill decision: Refill pended and routed to the provider for review/determination due to the following criteria not met:     ofloxacin (FLOXIN) 0.3 % otic solution - Medication not on ENT refill protocol

## 2024-11-05 RX ORDER — OFLOXACIN 3 MG/ML
SOLUTION AURICULAR (OTIC)
Qty: 5 ML | Refills: 0 | Status: SHIPPED | OUTPATIENT
Start: 2024-11-05

## 2024-11-14 ENCOUNTER — OFFICE VISIT (OUTPATIENT)
Dept: OTOLARYNGOLOGY | Facility: CLINIC | Age: 64
End: 2024-11-14
Attending: OTOLARYNGOLOGY
Payer: COMMERCIAL

## 2024-11-14 VITALS
TEMPERATURE: 97.8 F | BODY MASS INDEX: 21.93 KG/M2 | OXYGEN SATURATION: 95 % | SYSTOLIC BLOOD PRESSURE: 123 MMHG | DIASTOLIC BLOOD PRESSURE: 87 MMHG | WEIGHT: 140 LBS | HEART RATE: 81 BPM

## 2024-11-14 DIAGNOSIS — H72.92 PERFORATION OF LEFT TYMPANIC MEMBRANE: ICD-10-CM

## 2024-11-14 DIAGNOSIS — H69.93 DYSFUNCTION OF BOTH EUSTACHIAN TUBES: Primary | ICD-10-CM

## 2024-11-14 PROCEDURE — 99212 OFFICE O/P EST SF 10 MIN: CPT | Mod: 25 | Performed by: OTOLARYNGOLOGY

## 2024-11-14 PROCEDURE — 92504 EAR MICROSCOPY EXAMINATION: CPT | Performed by: OTOLARYNGOLOGY

## 2024-11-14 ASSESSMENT — PAIN SCALES - GENERAL: PAINLEVEL_OUTOF10: NO PAIN (0)

## 2024-11-14 NOTE — LETTER
11/14/2024      RE: Trupti Mcbride  [address] [facility] [address]         [facility] CLINIC [address]  [address] [facility] [address]  Phone: [phone]  Fax: [phone]    Patient:  Trupti Mcbride, Date of birth 1960  Date of Visit:  11/14/2024  Referring Provider Sommer Live      Assessment & Plan     Trupti Mcbride is a 64 year old female being seen for follow-up for bilateral t-tube checks. Her left ear perforation has remained stable with clean edges, and the right PE tube has remained in position. Due to the long-standing stability of her symptoms and physical exam, she has been advised to follow-up with Narda Talbert or Dr. Lee one of our APPs. She has been encouaged to use Floxin for 5 days prior to the next visit, and has been encouraged to call for a refill if needed. She is in agreement with this plan and will return in 6-months.     HPI  Trupti Mcbride is a 64 year old female being seen for follow-up for bilateral t-tube checks. She was previously seen on 05/15/2024, where the left-side perforation was stable and the PE tube in the right side was open and functioning properly.     She presents today with stable symptoms and nothing new to report.     /87   Pulse 81   Temp 97.8  F (36.6  C)   Wt 63.5 kg (140 lb)   SpO2 95%   BMI 21.93 kg/m     Physical examination:  female in no acute distress.  Alert and answering questions appropriately.  HB 1/6 bilaterally.  Both ears examined under the microscope.  Left ear had some squamous growth on the inferior perforation with a little bit of crusting on the TM posteriorly, which was suctioned. The perforation has been trying to close, and had closed about 50%, but after suctioning the skin lining away, it became a 20% perforation with clean edges. The surface of the TM is essentially covered in a cholesteatoma matrix, which was attempted to be suctioned off as  best as possible with no retractions noted.   The right PE is in position in the anterior quadrant with a slightly heaped up layer of squamous cholesteatoma around the base, which was removed with a straight pick and alligator, which allowed me to remove the underlying squamous debris that was built up on the TM. She has small perforation around the base and again, this was tried to be suctioned off as much as possible from the surface of the TM and from around the tube. The middle ear is aerated.       Scribe Disclosure:   I, April Leon, am serving as a scribe; to document services personally performed by Sommer Live MD -based on data collection and the provider's statements to me.     Provider Disclosure:  I agree with above History, Review of Systems, Physical exam and Plan.  I have reviewed the content of the documentation and have edited it as needed. I have personally performed the services documented here and the documentation accurately represents those services and the decisions I have made.        Sommer Live MD

## 2024-11-14 NOTE — PATIENT INSTRUCTIONS
You were seen in the ENT Clinic today by Dr. Live. If you have any questions or concerns after your appointment, please contact us (see below)      2.   Please return to clinic in 6 months        How to Contact Us:  Send a Lumate message to your provider. Our team will respond to you via Lumate. Occasionally, we will need to call you to get further information.  For urgent matters (Monday-Friday), call the ENT Clinic: [phone] and speak with a call center team member - they will route your call appropriately.   If you'd like to speak directly with a nurse, please find our contact information below. We do our best to check voicemail frequently throughout the day, and will work to call you back within 1-2 days. For urgent matters, please use the general clinic phone numbers listed above.      Jasmin TALLEY RN  ENT RN Care Coordinator  Direct: [phone]    Senait THOMAS LPN  Direct: [phone]

## 2024-11-14 NOTE — NURSING NOTE
Chief Complaint   Patient presents with    RECHECK     6 month follow up     ,Blood pressure 123/87, pulse 81, temperature 97.8  F (36.6  C), weight 63.5 kg (140 lb), SpO2 95%, not currently breastfeeding.      Edgardo Dominguez LPN

## 2024-12-31 ENCOUNTER — TELEPHONE (OUTPATIENT)
Dept: OTOLARYNGOLOGY | Facility: CLINIC | Age: 64
End: 2024-12-31
Payer: COMMERCIAL

## 2024-12-31 NOTE — TELEPHONE ENCOUNTER
Left Voicemail (1st Attempt) for the patient to call back and schedule the following:    Appointment Type: Return Ear  Provider: Dr. Sommer Live   Appt date: RESCHEDULE 5/15  Specialty phone number: [phone]  Additional appointment(s) needed: YES  Additional Notes:

## 2025-02-18 ENCOUNTER — TELEPHONE (OUTPATIENT)
Dept: OTOLARYNGOLOGY | Facility: CLINIC | Age: 65
End: 2025-02-18
Payer: COMMERCIAL

## 2025-02-18 NOTE — TELEPHONE ENCOUNTER
Left Voicemail (1st Attempt) and Sent Mychart (1st Attempt) for the patient to call back and schedule the following:    Appointment Type: Return Ear  Provider: Dr. Sommer Live   Appt date: R/S 5/7/25 TO NEXT OPEN  Specialty phone number: [phone]  Additional appointment(s) needed:   Additional Notes:

## 2025-04-23 NOTE — PROGRESS NOTES
[facility] EAR NOSE AND THROAT CLINIC [address] [address]  Phone: [phone]  Fax: [phone]    Patient:  Trupti Mcbride, Date of birth 1960  Date of Visit:  04/28/2025  Referring Provider Sommer Live      Assessment & Plan    Trupti Mcbride is a 64 year old female being seen for follow-up for bilateral ear checks. Her left ear perforation has remained stable with clean edges, perhaps even smaller at today's visit, and the right PE tube has remained in position. Due to the long-standing stability of her symptoms and physical exam, she has been advised to follow-up with one of our APPs. She has been encouaged to use Floxin on the right for 5 days prior to the next visit, and has been encouraged to call for a refill if needed. She is in agreement with this plan and will return in 6-months.     HPI  Trupti Mcbride is a 64 year old female being seen for follow-up for bilateral ear checks. She was previously seen on 05/15/2024, where the left-side perforation was stable and the PE tube in the right side was open and functioning properly. On 11/14/24, she reported with stable symptoms and nothing new.    Today, she reports no major changes or new symptoms. She did use floxin drops a few days prior to today's visit.     Physical examination:    /82   Pulse 80   Temp 97.4  F (36.3  C)   Wt 64 kg (141 lb)   SpO2 94%   BMI 22.08 kg/m    Female in no acute distress.  Alert and answering questions appropriately.  HB 1/6 bilaterally.  Bilateral ears examined under the microscope.   Left ear had some squamous growth on the inferior perforation, which was suctioned. The inferior perforation is adjacent to the prior cartilage graft with thick TM borders, roughly 15% of the TM.   The right PE is in position in the anterior quadrant with a slightly heaped up layer of squamous cholesteatoma and cerumen around the base, which was removed with a straight  pick, alligator, and suction, which allowed the underlying squamous debris that was built up on the TM to be removed. She has small perforation around the base and again, this was tried to be suctioned off as much as possible from the surface of the TM and from around the tube. The middle ear is aerated.   Please note the microscope was necessary to examine both ears due to the perforation on the left and bilateral eustachian tube dysfunction.      Scribe Disclosure:   I, Sofiya Matthew, am serving as a scribe; to document services personally performed by Sommer Live MD -based on data collection and the provider's statements to me.     Provider Disclosure:  I agree with above History, Review of Systems, Physical exam and Plan.  I have reviewed the content of the documentation and have edited it as needed. I have personally performed the services documented here and the documentation accurately represents those services and the decisions I have made.      Brian Nelson MD  Otolaryngology Resident, PGY-2    I, Sommer Live MD, saw this patient with the resident/fellow and agree with the resident/fellow s findings and plan of care as documented in the resident s/fellow s note. I was present for the entire procedure.

## 2025-04-28 ENCOUNTER — OFFICE VISIT (OUTPATIENT)
Dept: OTOLARYNGOLOGY | Facility: CLINIC | Age: 65
End: 2025-04-28
Payer: COMMERCIAL

## 2025-04-28 VITALS
OXYGEN SATURATION: 94 % | WEIGHT: 141 LBS | BODY MASS INDEX: 22.08 KG/M2 | DIASTOLIC BLOOD PRESSURE: 82 MMHG | HEART RATE: 80 BPM | TEMPERATURE: 97.4 F | SYSTOLIC BLOOD PRESSURE: 138 MMHG

## 2025-04-28 DIAGNOSIS — H61.21 IMPACTED CERUMEN OF RIGHT EAR: ICD-10-CM

## 2025-04-28 DIAGNOSIS — H72.92 PERFORATION OF LEFT TYMPANIC MEMBRANE: ICD-10-CM

## 2025-04-28 DIAGNOSIS — H69.93 DYSFUNCTION OF BOTH EUSTACHIAN TUBES: Primary | ICD-10-CM

## 2025-04-28 DIAGNOSIS — H90.0 CONDUCTIVE HEARING LOSS, BILATERAL: ICD-10-CM

## 2025-04-28 PROCEDURE — 1126F AMNT PAIN NOTED NONE PRSNT: CPT | Performed by: OTOLARYNGOLOGY

## 2025-04-28 PROCEDURE — 3075F SYST BP GE 130 - 139MM HG: CPT | Performed by: OTOLARYNGOLOGY

## 2025-04-28 PROCEDURE — 69210 REMOVE IMPACTED EAR WAX UNI: CPT | Mod: RT | Performed by: OTOLARYNGOLOGY

## 2025-04-28 PROCEDURE — 99212 OFFICE O/P EST SF 10 MIN: CPT | Mod: 25 | Performed by: OTOLARYNGOLOGY

## 2025-04-28 PROCEDURE — 3079F DIAST BP 80-89 MM HG: CPT | Performed by: OTOLARYNGOLOGY

## 2025-04-28 ASSESSMENT — PAIN SCALES - GENERAL: PAINLEVEL_OUTOF10: NO PAIN (0)

## 2025-04-28 NOTE — PATIENT INSTRUCTIONS
You were seen in the ENT Clinic today by Dr. Live. If you have any questions or concerns after your appointment, please contact us (see below)      2.   Please return to clinic in 6 months.        How to Contact Us:  Send a Azumio message to your provider. Our team will respond to you via Azumio. Occasionally, we will need to call you to get further information.  For urgent matters (Monday-Friday), call the ENT Clinic: [phone] and speak with a call center team member - they will route your call appropriately.   If you'd like to speak directly with a nurse, please find our contact information below. We do our best to check voicemail frequently throughout the day, and will work to call you back within 1-2 days. For urgent matters, please use the general clinic phone numbers listed above.      Jasmin TALLEY RN  ENT RN Care Coordinator  Direct: [phone]    Senait THOMAS LPN  Direct: [phone]

## 2025-04-28 NOTE — NURSING NOTE
Chief Complaint   Patient presents with    RECHECK     6 month follow up     Blood pressure 138/82, pulse 80, temperature 97.4  F (36.3  C), weight 64 kg (141 lb), SpO2 94%, not currently breastfeeding.  Edgardo Dominguez LPN

## 2025-04-28 NOTE — Clinical Note
4/28/2025       RE: Trupti Mcbride  [address] [facility] [address]     Dear Colleague,    Thank you for referring your patient, Trupti Mcbride, to the [facility] [place] [facility]. Please see a copy of my visit note below.      [facility] [place]  [address] [facility]  4TH FLOOR  [facility] [address]  Phone: [phone]  Fax: [phone]    Patient:  Trupti Mcbride, Date of birth 1960  Date of Visit:  04/28/2025  Referring Provider Sommer Live      Assessment & Plan   Trupti Mcbride is a 64 year old female being seen for follow-up for bilateral t-tube checks. Her left ear perforation has remained stable with clean edges, perhaps even smaller at today's visit, and the right PE tube has remained in position. Due to the long-standing stability of her symptoms and physical exam, she has been advised to follow-up with one of our APPs. She has been encouaged to use Floxin on the right for 5 days prior to the next visit, and has been encouraged to call for a refill if needed. She is in agreement with this plan and will return in 6-months.     HPI  Trupti Mcbride is a 64 year old female being seen for follow-up for bilateral t-tube checks. She was previously seen on 05/15/2024, where the left-side perforation was stable and the PE tube in the right side was open and functioning properly. On 11/14/24, she reported with stable symptoms and nothing new.    Today, she reports no major changes or new symptoms. She did use floxin drops a few days prior to today's visit.     Physical examination:    /82   Pulse 80   Temp 97.4  F (36.3  C)   Wt 64 kg (141 lb)   SpO2 94%   BMI 22.08 kg/m    Female in no acute distress.  Alert and answering questions appropriately.  HB 1/6 bilaterally.  Bilateral ears examined under the microscope.   Left ear had some squamous growth on  the inferior perforation, which was suctioned. The inferior perforation is adjacent to the prior cartilage graft with thick TM borders, roughly 15% of the TM.   The right PE is in position in the anterior quadrant with a slightly heaped up layer of squamous cholesteatoma around the base, which was removed with a straight pick, alligator, and suction, which allowed the underlying squamous debris that was built up on the TM to be removed. She has small perforation around the base and again, this was tried to be suctioned off as much as possible from the surface of the TM and from around the tube. The middle ear is aerated.       Scribe Disclosure:   I, Sofiya Castro, am serving as a scribe; to document services personally performed by Sommer Live MD -based on data collection and the provider's statements to me.     Provider Disclosure:  I agree with above History, Review of Systems, Physical exam and Plan.  I have reviewed the content of the documentation and have edited it as needed. I have personally performed the services documented here and the documentation accurately represents those services and the decisions I have made.      Brian Nelson MD  Otolaryngology Resident, PGY-2    I, Sommer Live MD, saw this patient with the resident/fellow and agree with the resident/fellow s findings and plan of care as documented in the resident s/fellow s note. I was present for the entire procedure.          [facility] E[facility] NOSE [facility] THROAT CLINIC [address] [address]  Phone: [phone]  Fax: [phone]    Patient:  Trupti Mcbride, Date of birth 1960  Date of Visit:  04/28/2025  Referring Provider Sommer Live      Assessment & Plan   Trupti Mcbride is a 64 year old female being seen for follow-up for bilateral ear checks. Her left ear perforation has remained stable with clean edges, perhaps even smaller at today's visit, and the right PE tube has  remained in position. Due to the long-standing stability of her symptoms and physical exam, she has been advised to follow-up with one of our APPs. She has been encouaged to use Floxin on the right for 5 days prior to the next visit, and has been encouraged to call for a refill if needed. She is in agreement with this plan and will return in 6-months.     JOJO Mcbride is a 64 year old female being seen for follow-up for bilateral ear checks. She was previously seen on 05/15/2024, where the left-side perforation was stable and the PE tube in the right side was open and functioning properly. On 11/14/24, she reported with stable symptoms and nothing new.    Today, she reports no major changes or new symptoms. She did use floxin drops a few days prior to today's visit.     Physical examination:    /82   Pulse 80   Temp 97.4  F (36.3  C)   Wt 64 kg (141 lb)   SpO2 94%   BMI 22.08 kg/m    Female in no acute distress.  Alert and answering questions appropriately.  HB 1/6 bilaterally.  Bilateral ears examined under the microscope.   Left ear had some squamous growth on the inferior perforation, which was suctioned. The inferior perforation is adjacent to the prior cartilage graft with thick TM borders, roughly 15% of the TM.   The right PE is in position in the anterior quadrant with a slightly heaped up layer of squamous cholesteatoma and cerumen around the base, which was removed with a straight pick, alligator, and suction, which allowed the underlying squamous debris that was built up on the TM to be removed. She has small perforation around the base and again, this was tried to be suctioned off as much as possible from the surface of the TM and from around the tube. The middle ear is aerated.   Please note the microscope was necessary to examine both ears due to the perforation on the left and bilateral eustachian tube dysfunction.      Scribe Disclosure:   I, Sofiya Castro, am serving as a scribe;  to document services personally performed by Sommer Live MD -based on data collection and the provider's statements to me.     Provider Disclosure:  I agree with above History, Review of Systems, Physical exam and Plan.  I have reviewed the content of the documentation and have edited it as needed. I have personally performed the services documented here and the documentation accurately represents those services and the decisions I have made.      Brian Nelson MD  Otolaryngology Resident, PGY-2    I, Sommer Live MD, saw this patient with the resident/fellow and agree with the resident/fellow s findings and plan of care as documented in the resident s/fellow s note. I was present for the entire procedure.          Again, thank you for allowing me to participate in the care of your patient.      Sincerely,    Sommer Live MD

## 2025-07-19 ENCOUNTER — HEALTH MAINTENANCE LETTER (OUTPATIENT)
Age: 65
End: 2025-07-19

## (undated) DEVICE — PREP POVIDONE-IODINE 7.5% SCRUB 4OZ BOTTLE MDS093945

## (undated) DEVICE — PREP POVIDONE IODINE SOLUTION 10% 120ML

## (undated) DEVICE — SUCTION MANIFOLD NEPTUNE 2 SYS 4 PORT 0702-020-000

## (undated) DEVICE — BLADE KNIFE BEAVER MINI BEAVER6400

## (undated) DEVICE — NDL ANGIOCATH 14GA 1.25" 4048

## (undated) DEVICE — PREP SKIN SCRUB TRAY 4461A

## (undated) DEVICE — SOL NACL 0.9% IRRIG 500ML BOTTLE 2F7123

## (undated) DEVICE — TUBING SUCTION 12"X1/4" N612

## (undated) DEVICE — DRSG COTTON BALL 6PK LCB62

## (undated) DEVICE — PREP POVIDONE IODINE SCRUB 7.5% 120ML

## (undated) DEVICE — LINEN TOWEL PACK X5 5464

## (undated) DEVICE — DRAPE EAR CHRONIC LATEX FREE 3609

## (undated) DEVICE — GLOVE PROTEXIS POWDER FREE SMT 6.5  2D72PT65X

## (undated) DEVICE — SYR 10ML LL W/O NDL

## (undated) DEVICE — PACK EAR CUSTOM ASC

## (undated) DEVICE — ESU GROUND PAD ADULT W/CORD E7507

## (undated) DEVICE — BLADE KNIFE BEAVER TYMPANOPLASTY 2.5MM W/60D DOWN 377200

## (undated) DEVICE — WIPE INSTRUMENT MEROCEL 400200

## (undated) DEVICE — BLADE KNIFE BEAVER MYRINGOTOMY 377100

## (undated) DEVICE — SOL WATER IRRIG 500ML BOTTLE 2F7113

## (undated) DEVICE — DRAPE SPLIT SHEET 77X108 REINFORCED 29436

## (undated) DEVICE — DRSG BANDAID 1X3" FABRIC CURITY LATEX FREE KC44101

## (undated) DEVICE — TUBE EAR GOODE T SIL 10-16010

## (undated) DEVICE — NIM ELEC SUBDERMAL NDL 3PAIR/BOX

## (undated) DEVICE — SPONGE SURGIFOAM 100 1974

## (undated) DEVICE — ESU ELEC BLADE 2.75" COATED/INSULATED E1455

## (undated) DEVICE — DRAPE MICROSCOPE LEICA 54X150" AR8033650

## (undated) DEVICE — PREP POVIDONE IODINE SOLUTION 10% 4OZ BOTTLE 29906-004

## (undated) RX ORDER — GABAPENTIN 300 MG/1
CAPSULE ORAL
Status: DISPENSED
Start: 2019-10-23

## (undated) RX ORDER — GABAPENTIN 300 MG/1
CAPSULE ORAL
Status: DISPENSED
Start: 2021-02-12

## (undated) RX ORDER — BACITRACIN 500 [USP'U]/G
OINTMENT OPHTHALMIC
Status: DISPENSED
Start: 2021-02-12

## (undated) RX ORDER — FENTANYL CITRATE 50 UG/ML
INJECTION, SOLUTION INTRAMUSCULAR; INTRAVENOUS
Status: DISPENSED
Start: 2019-10-23

## (undated) RX ORDER — HYDROMORPHONE HYDROCHLORIDE 1 MG/ML
INJECTION, SOLUTION INTRAMUSCULAR; INTRAVENOUS; SUBCUTANEOUS
Status: DISPENSED
Start: 2021-02-12

## (undated) RX ORDER — DEXAMETHASONE SODIUM PHOSPHATE 10 MG/ML
INJECTION, SOLUTION INTRAMUSCULAR; INTRAVENOUS
Status: DISPENSED
Start: 2019-10-23

## (undated) RX ORDER — CEFAZOLIN SODIUM 1 G/3ML
INJECTION, POWDER, FOR SOLUTION INTRAMUSCULAR; INTRAVENOUS
Status: DISPENSED
Start: 2019-10-23

## (undated) RX ORDER — CEFAZOLIN SODIUM 1 G/3ML
INJECTION, POWDER, FOR SOLUTION INTRAMUSCULAR; INTRAVENOUS
Status: DISPENSED
Start: 2021-02-12

## (undated) RX ORDER — PROPOFOL 10 MG/ML
INJECTION, EMULSION INTRAVENOUS
Status: DISPENSED
Start: 2021-02-12

## (undated) RX ORDER — OXYCODONE HYDROCHLORIDE 5 MG/1
TABLET ORAL
Status: DISPENSED
Start: 2021-02-12

## (undated) RX ORDER — ACETAMINOPHEN 325 MG/1
TABLET ORAL
Status: DISPENSED
Start: 2021-02-12

## (undated) RX ORDER — DEXMEDETOMIDINE HYDROCHLORIDE 4 UG/ML
INJECTION, SOLUTION INTRAVENOUS
Status: DISPENSED
Start: 2021-02-12

## (undated) RX ORDER — PROPOFOL 10 MG/ML
INJECTION, EMULSION INTRAVENOUS
Status: DISPENSED
Start: 2019-10-23

## (undated) RX ORDER — LIDOCAINE HYDROCHLORIDE 20 MG/ML
INJECTION, SOLUTION EPIDURAL; INFILTRATION; INTRACAUDAL; PERINEURAL
Status: DISPENSED
Start: 2019-10-23

## (undated) RX ORDER — ACETAMINOPHEN 325 MG/1
TABLET ORAL
Status: DISPENSED
Start: 2019-10-23

## (undated) RX ORDER — OFLOXACIN 3 MG/ML
SOLUTION/ DROPS OPHTHALMIC
Status: DISPENSED
Start: 2019-10-23

## (undated) RX ORDER — GLYCOPYRROLATE 0.2 MG/ML
INJECTION INTRAMUSCULAR; INTRAVENOUS
Status: DISPENSED
Start: 2019-10-23

## (undated) RX ORDER — DEXAMETHASONE SODIUM PHOSPHATE 10 MG/ML
INJECTION, SOLUTION INTRAMUSCULAR; INTRAVENOUS
Status: DISPENSED
Start: 2021-02-12

## (undated) RX ORDER — ONDANSETRON 2 MG/ML
INJECTION INTRAMUSCULAR; INTRAVENOUS
Status: DISPENSED
Start: 2019-10-23

## (undated) RX ORDER — OXYCODONE HYDROCHLORIDE 5 MG/1
TABLET ORAL
Status: DISPENSED
Start: 2019-10-23

## (undated) RX ORDER — EPHEDRINE SULFATE 50 MG/ML
INJECTION, SOLUTION INTRAMUSCULAR; INTRAVENOUS; SUBCUTANEOUS
Status: DISPENSED
Start: 2021-02-12

## (undated) RX ORDER — FENTANYL CITRATE 50 UG/ML
INJECTION, SOLUTION INTRAMUSCULAR; INTRAVENOUS
Status: DISPENSED
Start: 2021-02-12

## (undated) RX ORDER — ONDANSETRON 2 MG/ML
INJECTION INTRAMUSCULAR; INTRAVENOUS
Status: DISPENSED
Start: 2021-02-12